# Patient Record
Sex: MALE | Race: BLACK OR AFRICAN AMERICAN | NOT HISPANIC OR LATINO | Employment: UNEMPLOYED | URBAN - METROPOLITAN AREA
[De-identification: names, ages, dates, MRNs, and addresses within clinical notes are randomized per-mention and may not be internally consistent; named-entity substitution may affect disease eponyms.]

---

## 2019-03-13 PROBLEM — M25.562 BILATERAL KNEE PAIN: Status: ACTIVE | Noted: 2019-03-13

## 2019-03-13 PROBLEM — M25.561 BILATERAL KNEE PAIN: Status: ACTIVE | Noted: 2019-03-13

## 2021-07-26 LAB — EGD FOLLOW UP EXTERNAL: NORMAL

## 2022-01-28 ENCOUNTER — TELEPHONE (OUTPATIENT)
Dept: NEUROLOGY | Facility: HOSPITAL | Age: 42
End: 2022-01-28
Payer: MEDICAID

## 2022-01-28 NOTE — TELEPHONE ENCOUNTER
Gi clinic appt scheduled with pt on Wednesday, February 16, 2022 at 930am from referral by Dr. Lane for diarrhea and abd pain.  Pt given clinic address and repeated all information given correctly.

## 2022-02-16 ENCOUNTER — OFFICE VISIT (OUTPATIENT)
Dept: NEUROLOGY | Facility: HOSPITAL | Age: 42
End: 2022-02-16
Attending: INTERNAL MEDICINE
Payer: MEDICAID

## 2022-02-16 VITALS
HEART RATE: 93 BPM | TEMPERATURE: 99 F | DIASTOLIC BLOOD PRESSURE: 75 MMHG | WEIGHT: 301.13 LBS | BODY MASS INDEX: 43.11 KG/M2 | SYSTOLIC BLOOD PRESSURE: 128 MMHG | RESPIRATION RATE: 18 BRPM | HEIGHT: 70 IN

## 2022-02-16 DIAGNOSIS — R10.9 ABDOMINAL PAIN, UNSPECIFIED ABDOMINAL LOCATION: Primary | ICD-10-CM

## 2022-02-16 PROCEDURE — 99215 OFFICE O/P EST HI 40 MIN: CPT | Performed by: INTERNAL MEDICINE

## 2022-02-16 RX ORDER — PREDNISONE 20 MG/1
20 TABLET ORAL 2 TIMES DAILY
COMMUNITY
Start: 2021-12-22 | End: 2023-11-22

## 2022-02-16 RX ORDER — ATORVASTATIN CALCIUM 40 MG/1
40 TABLET, FILM COATED ORAL DAILY
COMMUNITY
Start: 2021-11-08

## 2022-02-16 RX ORDER — CHOLESTYRAMINE 4 G/9G
POWDER, FOR SUSPENSION ORAL
COMMUNITY
Start: 2021-07-15 | End: 2023-11-22

## 2022-02-16 RX ORDER — LEVOFLOXACIN 500 MG/1
500 TABLET, FILM COATED ORAL DAILY
COMMUNITY
Start: 2021-12-22 | End: 2023-11-22

## 2022-02-16 RX ORDER — TAMSULOSIN HYDROCHLORIDE 0.4 MG/1
0.4 CAPSULE ORAL
COMMUNITY

## 2022-02-16 RX ORDER — GABAPENTIN 400 MG/1
400 CAPSULE ORAL
COMMUNITY
Start: 2021-12-29 | End: 2023-11-22

## 2022-02-16 RX ORDER — MELOXICAM 15 MG/1
15 TABLET ORAL
COMMUNITY
Start: 2021-12-29 | End: 2022-03-29

## 2022-02-16 RX ORDER — AMOXICILLIN 500 MG/1
TABLET, FILM COATED ORAL
COMMUNITY
End: 2023-11-22 | Stop reason: ALTCHOICE

## 2022-02-16 NOTE — PATIENT INSTRUCTIONS
Take Imodium daily for 4 weeks.  If symptoms don't resolve, take 1 IBgard with meals for 4 weeks.  If symptoms don't resolve, take Rifaxan 550mg three times daily for 2 weeks.    Audio follow up appointment scheduled on Wednesday, April 13, 2022 at 115pm.(This is a telephone visit, you will be called at this time.)

## 2022-02-16 NOTE — PROGRESS NOTES
"[facility]    CC: Post-prandial abdominal pain     HPI 41 y.o. male with history of HTN and chronic low back pain presenting for evaluation of chronic, post-prandial abdominal pain and fecal urgency. Patient admits to having post-prandial abdominal pain and cramping since childhood. He admits to having generalized pain and fatigue after eating certain foods and liquids especially lactose-containing products. He admits to having symptoms while eating meals, and he subsequently has fecal urgency without incontinence. He admits to mild improvement in his abdominal discomfort after having a bowel movement. His bowel movements are semi-solid, and he usually has one bowel movement daily. He denies any episodes of melena or hematochezia. He has tried multiple medications in the past for his symptoms with minimal improvement including dicyclomine, questran, colesevelam, and amitriptyline. He has also tried a low FODMAP diet with minimal improvement as well. He had a colonoscopy in 11/2020 which was normal including negative biopsies for microscopic colitis. He had a normal EGD in 7/2021. He has previously been evaluated for thyroid disease, Celiac disease and H. Pylori which were all negative.      I have reviewed his medical records from [facility] including prior medication history.     Past Medical History  IBS-D  HTN  OA    Physical Examination  /75 (BP Location: Left arm, Patient Position: Sitting, BP Method: Large (Automatic))   Pulse 93   Temp 98.6 °F (37 °C) (Oral)   Resp 18   Ht 5' 10" (1.778 m)   Wt (!) 136.6 kg (301 lb 2.4 oz)   BMI 43.21 kg/m²   General appearance: alert, cooperative, no distress  Lungs: clear to auscultation bilaterally, no dullness to percussion bilaterally  Heart: regular rate and rhythm without rub; no displacement of the PMI   Abdomen: soft, non-tender; bowel sounds normoactive; no organomegaly      Assessment:   41 y.o. male presenting with: "   1. Chronic, progressively worsening post-prandial abdominal pain without associated nausea, vomiting or weight loss. This symptom is most consistent with intestinal dysfunction such as impaired fundic accomodation   2. Fecal urgency after eating. (not really diarrhea). This is also likely to be functional in nature as an exaggeration of the gastrocolic reflex     Plan:  - Start taking imodium once daily for fecal urgency   - Start taking two tablets of IBgard prior to meals for post prandial abdominal pain   - If no improvement with above medications, will prescribe rifaximin 550 mg TID for 2 weeks for IBS-D  - If no improvement with Rifaximin, would consider trial of Creon x 4 weeks then alosetron (low dose)     RTC: 8 weeks via Telemedicine visit to re-assess symptoms after medication changes     Guero Palacio MD   [address] [place] [address], [address]   [place] [address] [phone]

## 2022-04-13 ENCOUNTER — OFFICE VISIT (OUTPATIENT)
Dept: NEUROLOGY | Facility: HOSPITAL | Age: 42
End: 2022-04-13
Attending: INTERNAL MEDICINE
Payer: MEDICAID

## 2022-04-13 DIAGNOSIS — R14.0 ABDOMINAL BLOATING: Primary | ICD-10-CM

## 2022-04-13 NOTE — PROGRESS NOTES
Established Patient - Audio Only Telehealth Visit     The patient location is: home  The chief complaint leading to consultation is: fecal urgency  Visit type: Virtual visit with audio only (telephone)  Total time spent with patient: 15 minutes  Total time spent on patient encounter: 30 minutes     The reason for the audio only service rather than synchronous audio and video virtual visit was related to technical difficulties or patient preference/necessity.     Each patient to whom I provide medical services by telemedicine is:  (1) informed of the relationship between the physician and patient and the respective role of any other health care provider with respect to management of the patient; and (2) notified that they may decline to receive medical services by telemedicine and may withdraw from such care at any time. Patient verbally consented to receive this service via voice-only telephone call.    CC: fecal urgency     HPI:   42yo M with HTN, low back pain presents via telemedicine to follow up post-prandial fecal urgency associated with bloating relieved with BMs. No diarrhea, incontinence or nocturnal awakening. No improvement in the past with bentyl, questran, colesevelam, elavil, or recent trial with imodium prior to meals or scheduled IBgard that was recommended after visit in Feb. Avoids lactose-containing foods. No coffee/tea/energy drinks. No artifical sweeteners. No coconut water. No abdominal XRT. No rectal bleeding or weight loss. No anorectal pain. Only occurs after eating and has to immediately use toilet. Yesterday ate fruit loops with almond milk, chicken, potato chips, and vegan/meatless meat product (ie beyond burger). Does not eat diet high in FODMAPs.    Prior workup includes normal colonoscopy with negative microscopic colitis bx (11/2020), normal EGD. Thyroid, celiac and h pylori testing negative.     Assessment:  42yo M with post-prandial bloating and fecal urgency without incontinence or  diarrhea for which testing has been unrevealing and has not improved despite medications trials with bentyl, questran, colesevelam, elavil, imodium or IBgard. Suspect IBS-D vs SIBO vs impaired gastric compliance vs exaggerated gastrocolic reflex vs non-celiac gluten senstivity. Fecal urgency is a chronic problem with exacerbation.     Plan:   - fiber supplementation with psylium husk BID scheduled for 2 weeks to bulk stools and slow gastric emptying  - if above fails, trial Xifaxan 550mg TID for 2 weeks for IBS-D & SIBO, Rx provided  - If no improvement with fiber or Rifaximin, would consider trial of Creon x 4 weeks then alosetron (low dose)      RTC via telemedicine in 5 weeks to re-assess symptoms     This service was not originating from a related E/M service provided within the previous 7 days nor will  to an E/M service or procedure within the next 24 hours or my soonest available appointment.  Prevailing standard of care was able to be met in this audio-only visit.

## 2022-05-18 ENCOUNTER — OFFICE VISIT (OUTPATIENT)
Dept: NEUROLOGY | Facility: HOSPITAL | Age: 42
End: 2022-05-18
Attending: INTERNAL MEDICINE
Payer: MEDICAID

## 2022-05-18 DIAGNOSIS — R10.9 ABDOMINAL PAIN, UNSPECIFIED ABDOMINAL LOCATION: Primary | ICD-10-CM

## 2022-05-18 DIAGNOSIS — R14.0 ABDOMINAL BLOATING: ICD-10-CM

## 2022-05-18 RX ORDER — ALOSETRON HYDROCHLORIDE 0.5 MG/1
0.5 TABLET ORAL 2 TIMES DAILY
Qty: 60 TABLET | Refills: 1 | Status: SHIPPED | OUTPATIENT
Start: 2022-05-18 | End: 2022-06-27

## 2022-05-18 NOTE — PROGRESS NOTES
[facility]: - Audio Only Telehealth Visit     The patient location is: Home  The chief complaint leading to consultation is: Abdominal pain  Visit type: Virtual visit with audio only (telephone)  Total time spent with patient: 15 minutes     The reason for the audio only service rather than synchronous audio and video virtual visit was related to technical difficulties or patient preference/necessity.     Each patient to whom I provide medical services by telemedicine is:  (1) informed of the relationship between the physician and patient and the respective role of any other health care provider with respect to management of the patient; and (2) notified that they may decline to receive medical services by telemedicine and may withdraw from such care at any time. Patient verbally consented to receive this service via voice-only telephone call.      HPI: 41 y.o. male with history of HTN and chronic low back pain presenting for follow up of chronic, post-prandial abdominal pain and fecal urgency. He admits to having generalized pain and fatigue after eating certain foods and liquids especially lactose-containing products. He admits to having symptoms while eating meals, and he subsequently has fecal urgency without incontinence. He admits to mild improvement in his abdominal discomfort after having a bowel movement. His bowel movements are semi-solid, and he usually has one bowel movement daily. He denies any episodes of melena or hematochezia. He has tried multiple medications in the past for his symptoms with minimal improvement including dicyclomine, questran, colesevelam, and amitriptyline. He has also tried a low FODMAP diet with minimal improvement as well. He had a colonoscopy in 11/2020 which was normal including negative biopsies for microscopic colitis. He had a normal EGD in 7/2021. He has previously been evaluated for thyroid disease, Celiac disease and H. Pylori which were all negative.       Interval History:  Since his last visit, he started on Imodium daily for fecal urgency and took trial of Xifaxan 550 mg BID x1 week but discontinued use because he didn't feel it helped. He is having 1 bowel movement daily without blood or melena. Mainly fecal urgency that bothers him. Has not identified any food triggers. Has not tried IBgard as previously recommended.     Assessment:    1. Chronic, progressively worsening post-prandial abdominal pain without associated nausea, vomiting or weight loss. This symptom is most consistent with intestinal dysfunction such as impaired fundic accomodation   2. Fecal urgency after eating. (not really diarrhea). This is also likely to be functional in nature as an exaggeration of the gastrocolic reflex    Plan:  - Recommend patient to start daily food diary to keep track of symptoms and potential food triggers.  - Recommend to start low dose alosetron 0.5 BID x 2-4 weeks as a trial for IBS-D.   - Advised he can take alosetron along with Imodium daily for fecal urgency.  - If no improvement, start IBgard or buspirone to help increase gastric accomodation. Alternatively, can try Creon x4 weeks.    RTC in 4 weeks via telemedicine to discuss symptoms      This service was not originating from a related E/M service provided within the previous 7 days nor will  to an E/M service or procedure within the next 24 hours or my soonest available appointment.  Prevailing standard of care was able to be met in this audio-only visit.

## 2022-05-19 ENCOUNTER — PATIENT MESSAGE (OUTPATIENT)
Dept: NEUROLOGY | Facility: HOSPITAL | Age: 42
End: 2022-05-19
Payer: MEDICAID

## 2022-05-20 ENCOUNTER — TELEPHONE (OUTPATIENT)
Dept: NEUROLOGY | Facility: HOSPITAL | Age: 42
End: 2022-05-20
Payer: MEDICAID

## 2022-06-15 ENCOUNTER — OFFICE VISIT (OUTPATIENT)
Dept: NEUROLOGY | Facility: HOSPITAL | Age: 42
End: 2022-06-15
Attending: INTERNAL MEDICINE
Payer: MEDICAID

## 2022-06-15 DIAGNOSIS — R10.9 ABDOMINAL PAIN, UNSPECIFIED ABDOMINAL LOCATION: Primary | ICD-10-CM

## 2022-06-15 NOTE — PROGRESS NOTES
Established Patient - Audio Only Telehealth Visit     The patient location is: home  The chief complaint leading to consultation is: abdominal pain  Visit type: Virtual visit with audio only (telephone)  Total time spent with patient: 15 mins  Total encounter time: 20 minutes       The reason for the audio only service rather than synchronous audio and video virtual visit was related to technical difficulties or patient preference/necessity.     Each patient to whom I provide medical services by telemedicine is:  (1) informed of the relationship between the physician and patient and the respective role of any other health care provider with respect to management of the patient; and (2) notified that they may decline to receive medical services by telemedicine and may withdraw from such care at any time. Patient verbally consented to receive this service via voice-only telephone call.       HPI: 41 y.o. male with history of HTN and chronic low back pain presenting for follow up of chronic, post-prandial abdominal pain and fecal urgency. He admits to having generalized pain and fatigue after eating certain foods and liquids especially lactose-containing products. He admits to having symptoms while eating meals, and he subsequently has fecal urgency without incontinence. He admits to mild improvement in his abdominal discomfort after having a bowel movement. His bowel movements are semi-solid, and he usually has one bowel movement daily. He denies any episodes of melena or hematochezia. He has tried multiple medications in the past for his symptoms with minimal improvement including dicyclomine, questran, colesevelam, and amitriptyline. He has also tried a low FODMAP diet with minimal improvement as well. He had a colonoscopy in 11/2020 which was normal including negative biopsies for microscopic colitis. He had a normal EGD in 7/2021. He has previously been evaluated for thyroid disease, Celiac disease and H. Pylori  which were all negative.       Interval History:  Since his last visit, he has been taking alosetran 0.5 bid as recommended in addition immodium. He reports this has improved his urgency to defecate after eating greatly. He has missed a dose a dose or two, and noticed his previous symptoms will occur. He also states he has an upcoming appt with bariatric surgery for surgical eval.      Assessment:  1. Chronic, progressively worsening post-prandial abdominal pain without associated nausea, vomiting or weight loss. This symptom is most consistent with intestinal dysfunction such as impaired fundic accomodation   2. Fecal urgency after eating. (not really diarrhea). This is also likely to be functional in nature as an exaggeration of the gastrocolic reflex     Plan:  -continue alostreon 0.5 bid with imodium, as it has improved his symptoms  -if symptoms resurface, advised to call back for evaluation; will consider [facility]    RTC PRN      This service was not originating from a related E/M service provided within the previous 7 days nor will  to an E/M service or procedure within the next 24 hours or my soonest available appointment.  Prevailing standard of care was able to be met in this audio-only visit.

## 2023-03-01 LAB
CHOLEST SERPL-MSCNC: 119 MG/DL (ref 0–200)
HDLC SERPL-MCNC: 31 MG/DL (ref 35–70)
LDLC SERPL CALC-MCNC: 65 MG/DL
TRIGL SERPL-MCNC: 115 MG/DL (ref 40–160)

## 2023-11-16 PROCEDURE — 80053 COMPREHEN METABOLIC PANEL: CPT | Performed by: FAMILY MEDICINE

## 2023-11-16 PROCEDURE — 84443 ASSAY THYROID STIM HORMONE: CPT | Performed by: FAMILY MEDICINE

## 2023-11-16 PROCEDURE — 80061 LIPID PANEL: CPT | Performed by: FAMILY MEDICINE

## 2023-11-16 PROCEDURE — 82306 VITAMIN D 25 HYDROXY: CPT | Performed by: FAMILY MEDICINE

## 2023-11-16 PROCEDURE — 82550 ASSAY OF CK (CPK): CPT | Performed by: FAMILY MEDICINE

## 2023-11-16 PROCEDURE — 84550 ASSAY OF BLOOD/URIC ACID: CPT | Performed by: FAMILY MEDICINE

## 2023-11-16 PROCEDURE — 85025 COMPLETE CBC W/AUTO DIFF WBC: CPT | Performed by: FAMILY MEDICINE

## 2023-11-20 PROBLEM — E66.9 OBESITY, UNSPECIFIED: Status: ACTIVE | Noted: 2023-11-20

## 2023-11-20 PROBLEM — E78.2 MIXED HYPERLIPIDEMIA: Status: ACTIVE | Noted: 2023-11-20

## 2023-11-20 PROBLEM — E79.0 HYPERURICEMIA: Status: ACTIVE | Noted: 2023-11-20

## 2023-11-20 PROBLEM — G47.33 OSA (OBSTRUCTIVE SLEEP APNEA): Status: ACTIVE | Noted: 2023-11-20

## 2023-11-20 PROBLEM — I10 HYPERTENSION: Status: ACTIVE | Noted: 2023-11-20

## 2023-11-20 PROBLEM — K21.9 GERD (GASTROESOPHAGEAL REFLUX DISEASE): Status: ACTIVE | Noted: 2023-11-20

## 2023-11-20 PROBLEM — G47.30 SLEEP APNEA: Status: ACTIVE | Noted: 2023-11-20

## 2023-11-22 ENCOUNTER — OFFICE VISIT (OUTPATIENT)
Dept: FAMILY MEDICINE | Facility: CLINIC | Age: 43
End: 2023-11-22
Payer: MEDICAID

## 2023-11-22 VITALS
HEIGHT: 70 IN | OXYGEN SATURATION: 97 % | SYSTOLIC BLOOD PRESSURE: 140 MMHG | BODY MASS INDEX: 44.38 KG/M2 | RESPIRATION RATE: 16 BRPM | TEMPERATURE: 98 F | HEART RATE: 78 BPM | DIASTOLIC BLOOD PRESSURE: 90 MMHG | WEIGHT: 310 LBS

## 2023-11-22 DIAGNOSIS — E78.2 MIXED HYPERLIPIDEMIA: ICD-10-CM

## 2023-11-22 DIAGNOSIS — I10 PRIMARY HYPERTENSION: Primary | ICD-10-CM

## 2023-11-22 DIAGNOSIS — E66.01 CLASS 3 SEVERE OBESITY DUE TO EXCESS CALORIES WITH SERIOUS COMORBIDITY AND BODY MASS INDEX (BMI) OF 40.0 TO 44.9 IN ADULT: ICD-10-CM

## 2023-11-22 PROBLEM — E66.813 CLASS 3 SEVERE OBESITY DUE TO EXCESS CALORIES WITH SERIOUS COMORBIDITY AND BODY MASS INDEX (BMI) OF 40.0 TO 44.9 IN ADULT: Status: ACTIVE | Noted: 2023-11-20

## 2023-11-22 PROCEDURE — 1159F MED LIST DOCD IN RCRD: CPT | Mod: CPTII,,, | Performed by: FAMILY MEDICINE

## 2023-11-22 PROCEDURE — 1160F PR REVIEW ALL MEDS BY PRESCRIBER/CLIN PHARMACIST DOCUMENTED: ICD-10-PCS | Mod: CPTII,,, | Performed by: FAMILY MEDICINE

## 2023-11-22 PROCEDURE — 3080F DIAST BP >= 90 MM HG: CPT | Mod: CPTII,,, | Performed by: FAMILY MEDICINE

## 2023-11-22 PROCEDURE — 4010F ACE/ARB THERAPY RXD/TAKEN: CPT | Mod: CPTII,,, | Performed by: FAMILY MEDICINE

## 2023-11-22 PROCEDURE — 1159F PR MEDICATION LIST DOCUMENTED IN MEDICAL RECORD: ICD-10-PCS | Mod: CPTII,,, | Performed by: FAMILY MEDICINE

## 2023-11-22 PROCEDURE — 3080F PR MOST RECENT DIASTOLIC BLOOD PRESSURE >= 90 MM HG: ICD-10-PCS | Mod: CPTII,,, | Performed by: FAMILY MEDICINE

## 2023-11-22 PROCEDURE — 1160F RVW MEDS BY RX/DR IN RCRD: CPT | Mod: CPTII,,, | Performed by: FAMILY MEDICINE

## 2023-11-22 PROCEDURE — 3077F PR MOST RECENT SYSTOLIC BLOOD PRESSURE >= 140 MM HG: ICD-10-PCS | Mod: CPTII,,, | Performed by: FAMILY MEDICINE

## 2023-11-22 PROCEDURE — 3008F BODY MASS INDEX DOCD: CPT | Mod: CPTII,,, | Performed by: FAMILY MEDICINE

## 2023-11-22 PROCEDURE — 4010F PR ACE/ARB THEARPY RXD/TAKEN: ICD-10-PCS | Mod: CPTII,,, | Performed by: FAMILY MEDICINE

## 2023-11-22 PROCEDURE — 3008F PR BODY MASS INDEX (BMI) DOCUMENTED: ICD-10-PCS | Mod: CPTII,,, | Performed by: FAMILY MEDICINE

## 2023-11-22 PROCEDURE — 99214 PR OFFICE/OUTPT VISIT, EST, LEVL IV, 30-39 MIN: ICD-10-PCS | Mod: ,,, | Performed by: FAMILY MEDICINE

## 2023-11-22 PROCEDURE — 3077F SYST BP >= 140 MM HG: CPT | Mod: CPTII,,, | Performed by: FAMILY MEDICINE

## 2023-11-22 PROCEDURE — 99214 OFFICE O/P EST MOD 30 MIN: CPT | Mod: ,,, | Performed by: FAMILY MEDICINE

## 2023-11-22 RX ORDER — KETOCONAZOLE 20 MG/G
1 CREAM TOPICAL
COMMUNITY

## 2023-11-22 NOTE — PROGRESS NOTES
Patient Name: Edmund Roberts     Patient ID: [id number]     Chief Complaint: Results (Go over lab results.)      HPI:     Edmund Roberts is a 43 y.o. male here today for follow up for Hypertension, Hyperlipidemia, and lab work results. Reviewed and discussed lab work results.    Past Medical History:   Diagnosis Date    Arthritis     B12 deficiency     Cholesterol depletion     Elevated serum creatinine     Gastritis     GERD (gastroesophageal reflux disease)     Hypertension     Hyperuricemia     IBS (irritable bowel syndrome)     Mixed hyperlipidemia     Obesity, unspecified     ALICE (obstructive sleep apnea)     Plantar fasciitis, bilateral     Prostatitis, chronic     Sleep apnea     Vitamin D3 deficiency         Past Surgical History:   Procedure Laterality Date    BACK SURGERY  2006    BACK SURGERY  2013    CARPAL TUNNEL RELEASE  2019    CHOLECYSTECTOMY  06/2018    CYST REMOVAL Left 2005    SINUS SURGERY      TONSILLECTOMY  03/2017    TRIGGER FINGER RELEASE  11/2019        Social History     Socioeconomic History    Marital status: Single    Number of children: 5   Tobacco Use    Smoking status: Never    Smokeless tobacco: Never   Substance and Sexual Activity    Alcohol use: No    Drug use: No    Sexual activity: Yes     Partners: Female     Social Determinants of Health     Financial Resource Strain: Low Risk  (11/22/2023)    Overall Financial Resource Strain (CARDIA)     Difficulty of Paying Living Expenses: Not hard at all   Recent Concern: Financial Resource Strain - High Risk (11/16/2023)    Overall Financial Resource Strain (CARDIA)     Difficulty of Paying Living Expenses: Very hard   Food Insecurity: No Food Insecurity (11/22/2023)    Hunger Vital Sign     Worried About Running Out of Food in the Last Year: Never true     Ran Out of Food in the Last Year: Never true   Recent Concern: Food Insecurity - Food Insecurity Present (11/16/2023)    Hunger Vital Sign     Ran Out of Food in the Last Year:  Sometimes true   Transportation Needs: No Transportation Needs (11/22/2023)    PRAPARE - Transportation     Lack of Transportation (Medical): No     Lack of Transportation (Non-Medical): No   Physical Activity: Inactive (11/22/2023)    Exercise Vital Sign     Days of Exercise per Week: 0 days     Minutes of Exercise per Session: 0 min   Stress: Stress Concern Present (11/22/2023)    [employer] - Occupational Stress Questionnaire     Feeling of Stress : To some extent   Social Connections: Socially Isolated (11/22/2023)    Social Connection and Isolation Panel [NHANES]     Frequency of Communication with Friends and Family: Once a week     Frequency of Social Gatherings with Friends and Family: Once a week     Attends [employer] Services: 1 to 4 times per year     Active Member of Clubs or Organizations: No     Attends Club or Organization Meetings: Never     Marital Status: Never    Housing Stability: Unknown (11/22/2023)    Housing Stability Vital Sign     Unable to Pay for Housing in the Last Year: No     Unstable Housing in the Last Year: No        Current Outpatient Medications   Medication Instructions    atorvastatin (LIPITOR) 40 mg, Oral, Daily    fluticasone propionate (FLONASE) 50 mcg/actuation nasal spray fluticasone propionate 50 mcg/actuation nasal spray,suspension    hydroCHLOROthiazide (HYDRODIURIL) 25 mg, Oral, Daily    irbesartan (AVAPRO) 150 mg, Oral, Daily    ketoconazole (NIZORAL) 2 % cream 1 Application, Topical (Top), As needed (PRN)    tamsulosin (FLOMAX) 0.4 mg, Oral       Review of patient's allergies indicates:  No Known Allergies     Immunization History   Administered Date(s) Administered    COVID-19, MRNA, LN-S, PF (MODERNA FULL 0.5 ML DOSE) 08/12/2021, 09/09/2021    DTP 02/19/1980, 03/18/1980, 10/16/1980, 04/26/1982, 03/29/1984    Hepatitis B, Adult 12/01/2009, 06/29/2010    Influenza - Quadrivalent - PF *Preferred* (6 months and older) 03/14/2017     "MMR 04/26/1982    OPV 02/19/1980, 03/18/1980, 10/16/1980, 04/26/1982, 03/29/1984    Td (ADULT) 04/07/1994       Patient Care Team:  Omega Talavera Sr., MD as PCP - General (Family Medicine)  Demond Mcrae MD as Consulting Physician (Otolaryngology)  Fran Jordan MD as Consulting Physician (Pulmonary Disease)  [facility]  Romina Bowen MD as Consulting Physician (Cardiology)     Subjective:     Review of Systems    10 point review of systems conducted, negative except as stated in the history of present illness. See HPI for details.    Objective:     Visit Vitals  BP (!) 140/90 (BP Location: Right arm, Patient Position: Sitting, BP Method: Large (Manual))   Pulse 78   Temp 98.3 °F (36.8 °C)   Resp 16   Ht 5' 10" (1.778 m)   Wt (!) 140.6 kg (310 lb)   SpO2 97%   BMI 44.48 kg/m²       Physical Exam  Constitutional:       Appearance: He is obese.   HENT:      Head: Normocephalic and atraumatic.   Cardiovascular:      Rate and Rhythm: Normal rate and regular rhythm.   Pulmonary:      Effort: Pulmonary effort is normal.      Breath sounds: Normal breath sounds.   Abdominal:      Palpations: Abdomen is soft.      Tenderness: There is no abdominal tenderness.   Musculoskeletal:         General: No swelling or tenderness. Normal range of motion.      Cervical back: Normal range of motion and neck supple.      Right lower leg: No edema.      Left lower leg: No edema.   Lymphadenopathy:      Cervical: No cervical adenopathy.   Skin:     General: Skin is warm and dry.   Neurological:      General: No focal deficit present.      Mental Status: He is alert and oriented to person, place, and time.   Psychiatric:         Mood and Affect: Mood normal.         Assessment:       ICD-10-CM ICD-9-CM   1. Primary hypertension  I10 401.9   2. Mixed hyperlipidemia  E78.2 272.2   3. Class 3 severe obesity due to excess calories with serious comorbidity and " body mass index (BMI) of 40.0 to 44.9 in adult  E66.01 278.01    Z68.41 V85.41       Plan:   1. Primary hypertension  Overview:  Continue with Irbesartan 150 mg daily + HCTZ 25 mg daily as prescribed by Cardiology.   Followed by Cardiology.  Low Sodium Diet (DASH Diet - Less than 2 grams of sodium per day).  Monitor blood pressure daily and log. Report consistent numbers greater than 140/90.  Maintain healthy weight with goal BMI <30. Exercise 30 minutes per day, 5 days per week.    Assessment & Plan:  BP Readings from Last 1 Encounters:   11/22/23 (!) 140/90   Patient is not at goal.  Instructed patient to follow up with his Cardiologist regarding his high blood pressure at his appointment in 1 week.      2. Mixed hyperlipidemia  Overview:  Continue with Lipitor 40 mg nightly as prescribed by Cardiology.  Followed by Cardiology.  Stressed importance of dietary modifications. Follow a low cholesterol, low saturated fat diet with less that 200mg of cholesterol a day.  Avoid fried foods and high saturated fats (high saturated fats less than 7% of calories).  Add Flax Seed/Fish Oil supplements to diet. Increase dietary fiber.  Regular exercise can reduce LDL and raise HDL. Stressed importance of physical activity 5 times per week for 30 minutes per day.       Assessment & Plan:  Lab Results   Component Value Date    LDL 79.00 11/16/2023    TRIG 105 11/16/2023    TRIG 115 03/01/2023    HDL 29 (L) 11/16/2023    HDL 31 (A) 03/01/2023    TOTALCHOLEST 4 11/16/2023   Patient is at goal.      3. Class 3 severe obesity due to excess calories with serious comorbidity and body mass index (BMI) of 40.0 to 44.9 in adult  Overview:  Body mass index is 44.48 kg/m².  Goal BMI <30.  Exercise 5 times a week for 30 minutes per day.  Avoid soda, simple sugars, excessive rice, potatoes or bread. Limit fast foods and fried foods.  Choose complex carbs in moderation (example: green vegetables, beans, oatmeal). Eat plenty of fresh fruits and  vegetables with lean meats daily.  Do not skip meals. Eat a balanced portion size.  Avoid fad diets. Consider permanent healthy life style changes.           [x] Discussed lab findings with the patient.  [x] Discussed diet, exercise and if appropriate, weight loss.  [] Instructions and information, including risks and benefits of prescribed medication(s) have been reviewed with the patient and patient verbalizes understanding. Questions have been answered to the patient's satisfaction.  [] Appropriate counseling has been given regarding anxiety and depressive issues that were discussed today.  [] Any lab drawn today will be reviewed by physician at the time it is received and appropriate recommendations bill be made and discussed with patient.     Follow up in about 6 months (around 5/22/2024) for Follow Up.   In addition to their scheduled follow up, the patient has also been instructed to follow up on as needed basis.     Future Appointments   Date Time Provider Department Center   5/22/2024  7[facility]   5/29/2024  1:30 PM Omega Talavera Sr., MD [facility] Talavera Sr, MD

## 2023-11-22 NOTE — ASSESSMENT & PLAN NOTE
Lab Results   Component Value Date    LDL 79.00 11/16/2023    TRIG 105 11/16/2023    TRIG 115 03/01/2023    HDL 29 (L) 11/16/2023    HDL 31 (A) 03/01/2023    TOTALCHOLEST 4 11/16/2023   Patient is at goal.

## 2023-11-22 NOTE — ASSESSMENT & PLAN NOTE
BP Readings from Last 1 Encounters:   11/22/23 (!) 140/90   Patient is not at goal.  Instructed patient to follow up with his Cardiologist regarding his high blood pressure at his appointment in 1 week.

## 2024-03-27 ENCOUNTER — TELEPHONE (OUTPATIENT)
Dept: FAMILY MEDICINE | Facility: CLINIC | Age: 44
End: 2024-03-27
Payer: MEDICAID

## 2024-03-27 NOTE — TELEPHONE ENCOUNTER
Pt was called and instructed to contact his insurance company for other surgeons in his network.   He was advised to discuss his timeline expectations with his current surgeon.      Pt. reports he is afebrile . He does have a scratchy throat . He was encouraged to try OTC tylenol and a gargle . If symptoms persist he was instructed to call for an apt.

## 2024-03-27 NOTE — TELEPHONE ENCOUNTER
----- Message from Eliz Jeronimo sent at 3/25/2024  1:19 PM CDT -----  HE SEES AN ORTHOPEDIC SURGEON IN [place], DR. LON MYERS, BUT HE SAYS THE DOC IS DRAGGING THINGS OUT SO HE WANTS A REFERRAL FOR ANOTHER ORTHOPEDIC SURGEON.    HE HAS A SCRATCHY THROAT, RUNNY NOSE AND COUGHING UP MUCOUS.  HE WENT TO URGENT CARE ON FRIDAY BUT ALL THEY DID WAS TELL HIM TO TAKE IBUPROFEN 800 BUT IT ISN'T HELPING, CAN HE HAVE SOMETHING CALLED IN? WG PV

## 2024-07-09 PROCEDURE — 85025 COMPLETE CBC W/AUTO DIFF WBC: CPT | Performed by: FAMILY MEDICINE

## 2024-07-09 PROCEDURE — 80053 COMPREHEN METABOLIC PANEL: CPT | Performed by: FAMILY MEDICINE

## 2024-07-09 PROCEDURE — 82550 ASSAY OF CK (CPK): CPT | Performed by: FAMILY MEDICINE

## 2024-07-09 PROCEDURE — 82306 VITAMIN D 25 HYDROXY: CPT | Performed by: FAMILY MEDICINE

## 2024-07-09 PROCEDURE — 80061 LIPID PANEL: CPT | Performed by: FAMILY MEDICINE

## 2024-07-09 PROCEDURE — 84550 ASSAY OF BLOOD/URIC ACID: CPT | Performed by: FAMILY MEDICINE

## 2024-07-09 PROCEDURE — 84443 ASSAY THYROID STIM HORMONE: CPT | Performed by: FAMILY MEDICINE

## 2024-07-15 PROBLEM — R79.89 LOW VITAMIN D LEVEL: Status: ACTIVE | Noted: 2024-07-15

## 2024-07-15 NOTE — ASSESSMENT & PLAN NOTE
Lab Results   Component Value Date    LDL 79.00 11/16/2023    TRIG 105 11/16/2023    TRIG 115 03/01/2023    HDL 29 (L) 11/16/2023    HDL 31 (A) 03/01/2023    TOTALCHOLEST 4 11/16/2023     At goal   Continue above medication at same dose   Continue following with cardiology   Repeat lipids 6 months      Stressed importance of dietary modifications. Follow a low cholesterol, low saturated fat diet with less that 200mg of cholesterol a day.  Avoid fried foods and high saturated fats (high saturated fats less than 7% of calories).  Add Flax Seed/Fish Oil supplements to diet. Increase dietary fiber.  Regular exercise can reduce LDL and raise HDL. Stressed importance of physical activity 5 times per week for 30 minutes per day.

## 2024-07-15 NOTE — ASSESSMENT & PLAN NOTE
BP Readings from Last 1 Encounters:   11/22/23 (!) 140/90     Not at goal  Continue above medication at same dose   He will call Cardiology and prefers to let them handle it    Low Sodium Diet (DASH Diet - Less than 2 grams of sodium per day).  Monitor blood pressure daily and log. Report consistent numbers greater than 140/90.  Maintain healthy weight with goal BMI <30. Exercise 30 minutes per day, 5 days per week.

## 2024-07-15 NOTE — PROGRESS NOTES
Family Medicine    Patient ID: [id number]     Chief Complaint: Follow-up (Lab results)      HPI:     Edmund Roberts is a 43 y.o. male here today for a follow up.     Past Medical History:   Diagnosis Date    Arthritis     B12 deficiency     Cholesterol depletion     Elevated serum creatinine     Gastritis     GERD (gastroesophageal reflux disease)     Hypertension     Hyperuricemia     IBS (irritable bowel syndrome)     Mixed hyperlipidemia     Obesity, unspecified     ALICE (obstructive sleep apnea)     Plantar fasciitis, bilateral     Prostatitis, chronic     Sleep apnea     Vitamin D3 deficiency         Past Surgical History:   Procedure Laterality Date    BACK SURGERY  2006    BACK SURGERY  2013    CARPAL TUNNEL RELEASE  2019    CHOLECYSTECTOMY  06/2018    CYST REMOVAL Left 2005    SINUS SURGERY      TONSILLECTOMY  03/2017    TRIGGER FINGER RELEASE  11/2019        Social History     Tobacco Use    Smoking status: Never    Smokeless tobacco: Never   Substance and Sexual Activity    Alcohol use: No    Drug use: No    Sexual activity: Yes     Partners: Female        Current Outpatient Medications   Medication Instructions    atorvastatin (LIPITOR) 40 mg, Oral, Daily    celecoxib (CELEBREX) 100 mg, Oral, Daily    fluticasone propionate (FLONASE) 50 mcg/actuation nasal spray fluticasone propionate 50 mcg/actuation nasal spray,suspension    hydroCHLOROthiazide (HYDRODIURIL) 25 mg, Oral, Daily    hyoscyamine 0.125 mg, Sublingual, Every 6 hours PRN    irbesartan (AVAPRO) 300 mg, Oral, Daily    ketoconazole (NIZORAL) 2 % cream 1 Application, Topical (Top), As needed (PRN)    pantoprazole (PROTONIX) 40 mg, Oral, Daily    sucralfate (CARAFATE) 1 g, Oral, 2 times daily    tamsulosin (FLOMAX) 0.4 mg       Review of patient's allergies indicates:  No Known Allergies     Patient Care Team:  Omega Talavera Sr., MD as PCP - General (Family Medicine)  Demond Mcrae MD as Consulting Physician  "(Otolaryngology)  Fran Jordan MD as Consulting Physician (Pulmonary Disease)  Surgery, [facility] - Trauma/General (Trauma Surgery)  Romina Bowen MD as Consulting Physician (Cardiology)     Subjective:     Review of Systems    12 point review of systems conducted, negative except as stated in the history of present illness. See HPI for details.    Objective:     Visit Vitals  BP (!) 140/87 (BP Location: Left arm, Patient Position: Sitting)   Pulse 80   Temp 97.6 °F (36.4 °C)   Ht 5' 10" (1.778 m)   Wt (!) 141.5 kg (312 lb)   BMI 44.77 kg/m²       Physical Exam    Labs Reviewed:     Chemistry:  Lab Results   Component Value Date     07/09/2024    K 4.2 07/09/2024    CHLORIDE 102 10/03/2022    BUN 10.1 07/09/2024    CREATININE 1.25 (H) 07/09/2024    EGFRNORACEVR >60 07/09/2024    GLUCOSE 88 07/09/2024    CALCIUM 9.8 07/09/2024    ALKPHOS 140 07/09/2024    LABPROT 7.0 07/09/2024    ALBUMIN 4.1 07/09/2024    AST 17 07/09/2024    ALT 27 07/09/2024    [id number] 16 (L) 07/09/2024    TSH 3.125 07/09/2024        Lab Results   Component Value Date    HGBA1C 6.1 (H) 10/03/2022        Hematology:  Lab Results   Component Value Date    WBC 9.66 07/09/2024    HGB 13.9 (L) 07/09/2024    HCT 43.0 07/09/2024     07/09/2024       Lipid Panel:  Lab Results   Component Value Date    CHOL 131 07/09/2024    HDL 33 (L) 07/09/2024    LDL 76.00 07/09/2024    TRIG 108 07/09/2024    TOTALCHOLEST 4 07/09/2024        Urine:  No results found for: "COLORUA", "APPEARANCEUA", "SGUA", "PHUA", "PROTEINUA", "GLUCOSEUA", "KETONESUA", "BLOODUA", "NITRITESUA", "LEUKOCYTESUR", "RBCUA", "WBCUA", "BACTERIA", "SQEPUA", "HYALINECASTS", "CREATRANDUR", "PROTEINURINE", "UPROTCREA"     Assessment:       ICD-10-CM ICD-9-CM   1. Low vitamin D level  R79.89 790.6   2. Mixed hyperlipidemia  E78.2 272.2   3. Primary hypertension  I10 401.9   4. Class 3 severe obesity due to excess calories with serious comorbidity and body mass " index (BMI) of 40.0 to 44.9 in adult  E66.01 278.01    Z68.41 V85.41   5. History of orthopedic surgery  Z98.890 V45.89        Plan:     1. Low vitamin D level  Overview:  Vitamin-D level today:  16  Goal vitamin-D 40-60 per endocrine society    Plan:  3000 IU OTC vitamin-D once daily  Repeat vitamin-D in three-months      2. Mixed hyperlipidemia  Overview:  Lipitor 40 mg nightly as prescribed by Cardiology.  Followed by Cardiology.    Assessment & Plan:  Lab Results   Component Value Date    LDL 79.00 11/16/2023    TRIG 105 11/16/2023    TRIG 115 03/01/2023    HDL 29 (L) 11/16/2023    HDL 31 (A) 03/01/2023    TOTALCHOLEST 4 11/16/2023     At goal   Continue above medication at same dose   Continue following with cardiology   Repeat lipids 6 months      Stressed importance of dietary modifications. Follow a low cholesterol, low saturated fat diet with less that 200mg of cholesterol a day.  Avoid fried foods and high saturated fats (high saturated fats less than 7% of calories).  Add Flax Seed/Fish Oil supplements to diet. Increase dietary fiber.  Regular exercise can reduce LDL and raise HDL. Stressed importance of physical activity 5 times per week for 30 minutes per day.         3. Primary hypertension  Overview:  Irbesartan 150 mg daily + HCTZ 25 mg daily as prescribed by Cardiology.   Followed by Cardiology.    Assessment & Plan:  BP Readings from Last 1 Encounters:   11/22/23 (!) 140/90     Not at goal  Continue above medication at same dose   He will call Cardiology and prefers to let them handle it    Low Sodium Diet (DASH Diet - Less than 2 grams of sodium per day).  Monitor blood pressure daily and log. Report consistent numbers greater than 140/90.  Maintain healthy weight with goal BMI <30. Exercise 30 minutes per day, 5 days per week.        4. Class 3 severe obesity due to excess calories with serious comorbidity and body mass index (BMI) of 40.0 to 44.9 in adult  Overview:  Body mass index is 44.77  kg/m².    Was being set up for bariatric surgery and [place] 2 years ago  Had the psych evaluation  Met with a dietitian and do with the asked  Never got a preop appointment  We would like to establish with bariatric and [facility]    Assessment & Plan:  Refer to obesity medicine and bariatric    Orders:  -     Ambulatory referral/consult to Bariatric/Obesity Medicine; Future; Expected date: 07/24/2024    5. History of orthopedic surgery  Overview:  2006:  Lumbar diskectomy  2013:  Lumbar fusion  2019:  Left elbow, ulnar release     Surgeries performed in [place]  Still having lumbar pain  Now having difficulty extending his left elbow fully  We would like referral to [facility] and [facility] for both lumbar and left elbow  Declines physical therapy      Assessment & Plan:  Orthopedic referrals to [facility] for left elbow and low back  We will hold off on x-rays since [facility] performed them on site    Orders:  -     Ambulatory referral/consult to Orthopedics; Future; Expected date: 07/24/2024  -     Ambulatory referral/consult to Orthopedics; Future; Expected date: 07/24/2024         Follow up in about 3 months (around 10/17/2024) for With Labs Prior to Visit, vitamin-D, A1c, CMP, CBC. In addition to their scheduled follow up, the patient has also been instructed to follow up on as needed basis.     Future Appointments   Date Time Provider Department Center   7/17/2024  3:20 PM PROVIDER, [facility]   10/14/2024  9:20 AM LAB, [facility]   10/21/2024 11:20 AM PROVIDER, [facility] ANISHA Padgett MD

## 2024-07-17 ENCOUNTER — OFFICE VISIT (OUTPATIENT)
Dept: FAMILY MEDICINE | Facility: CLINIC | Age: 44
End: 2024-07-17
Payer: MEDICAID

## 2024-07-17 VITALS
DIASTOLIC BLOOD PRESSURE: 87 MMHG | BODY MASS INDEX: 44.67 KG/M2 | WEIGHT: 312 LBS | SYSTOLIC BLOOD PRESSURE: 140 MMHG | TEMPERATURE: 98 F | HEIGHT: 70 IN | HEART RATE: 80 BPM

## 2024-07-17 DIAGNOSIS — I10 PRIMARY HYPERTENSION: ICD-10-CM

## 2024-07-17 DIAGNOSIS — E66.01 CLASS 3 SEVERE OBESITY DUE TO EXCESS CALORIES WITH SERIOUS COMORBIDITY AND BODY MASS INDEX (BMI) OF 40.0 TO 44.9 IN ADULT: ICD-10-CM

## 2024-07-17 DIAGNOSIS — R79.89 LOW VITAMIN D LEVEL: Primary | ICD-10-CM

## 2024-07-17 DIAGNOSIS — E78.2 MIXED HYPERLIPIDEMIA: ICD-10-CM

## 2024-07-17 DIAGNOSIS — Z98.890 HISTORY OF ORTHOPEDIC SURGERY: ICD-10-CM

## 2024-07-17 PROCEDURE — 3077F SYST BP >= 140 MM HG: CPT | Mod: CPTII,,, | Performed by: FAMILY MEDICINE

## 2024-07-17 PROCEDURE — 1159F MED LIST DOCD IN RCRD: CPT | Mod: CPTII,,, | Performed by: FAMILY MEDICINE

## 2024-07-17 PROCEDURE — 1160F RVW MEDS BY RX/DR IN RCRD: CPT | Mod: CPTII,,, | Performed by: FAMILY MEDICINE

## 2024-07-17 PROCEDURE — 3008F BODY MASS INDEX DOCD: CPT | Mod: CPTII,,, | Performed by: FAMILY MEDICINE

## 2024-07-17 PROCEDURE — 99214 OFFICE O/P EST MOD 30 MIN: CPT | Mod: ,,, | Performed by: FAMILY MEDICINE

## 2024-07-17 PROCEDURE — 4010F ACE/ARB THERAPY RXD/TAKEN: CPT | Mod: CPTII,,, | Performed by: FAMILY MEDICINE

## 2024-07-17 PROCEDURE — 3079F DIAST BP 80-89 MM HG: CPT | Mod: CPTII,,, | Performed by: FAMILY MEDICINE

## 2024-07-17 RX ORDER — PANTOPRAZOLE SODIUM 40 MG/1
40 TABLET, DELAYED RELEASE ORAL DAILY
COMMUNITY
Start: 2024-06-26

## 2024-07-17 RX ORDER — SUCRALFATE 1 G/1
1 TABLET ORAL 2 TIMES DAILY
COMMUNITY

## 2024-07-17 RX ORDER — HYOSCYAMINE SULFATE 0.12 MG/1
0.12 TABLET SUBLINGUAL EVERY 6 HOURS PRN
COMMUNITY
Start: 2024-05-22

## 2024-07-17 RX ORDER — CELECOXIB 100 MG/1
100 CAPSULE ORAL DAILY
COMMUNITY

## 2024-07-17 NOTE — ASSESSMENT & PLAN NOTE
Orthopedic referrals to [facility] for left elbow and low back  We will hold off on x-rays since [facility] performed them on site

## 2024-08-19 DIAGNOSIS — Z98.890 HISTORY OF ORTHOPEDIC SURGERY: Primary | ICD-10-CM

## 2024-08-19 DIAGNOSIS — M25.561 CHRONIC PAIN OF BOTH KNEES: ICD-10-CM

## 2024-08-19 DIAGNOSIS — G89.29 CHRONIC PAIN OF BOTH KNEES: ICD-10-CM

## 2024-08-19 DIAGNOSIS — M25.562 CHRONIC PAIN OF BOTH KNEES: ICD-10-CM

## 2024-08-19 DIAGNOSIS — M54.50 LUMBAR PAIN: ICD-10-CM

## 2024-08-19 DIAGNOSIS — M25.529 ELBOW PAIN, UNSPECIFIED LATERALITY: ICD-10-CM

## 2024-09-09 ENCOUNTER — OFFICE VISIT (OUTPATIENT)
Dept: FAMILY MEDICINE | Facility: CLINIC | Age: 44
End: 2024-09-09
Payer: MEDICAID

## 2024-09-09 VITALS
BODY MASS INDEX: 45.1 KG/M2 | OXYGEN SATURATION: 96 % | SYSTOLIC BLOOD PRESSURE: 129 MMHG | HEART RATE: 80 BPM | HEIGHT: 70 IN | DIASTOLIC BLOOD PRESSURE: 75 MMHG | WEIGHT: 315 LBS | RESPIRATION RATE: 18 BRPM | TEMPERATURE: 98 F

## 2024-09-09 DIAGNOSIS — Z98.890 HISTORY OF ORTHOPEDIC SURGERY: ICD-10-CM

## 2024-09-09 DIAGNOSIS — M25.531 RIGHT WRIST PAIN: Primary | ICD-10-CM

## 2024-09-09 PROCEDURE — 3078F DIAST BP <80 MM HG: CPT | Mod: CPTII,,, | Performed by: FAMILY MEDICINE

## 2024-09-09 PROCEDURE — 3074F SYST BP LT 130 MM HG: CPT | Mod: CPTII,,, | Performed by: FAMILY MEDICINE

## 2024-09-09 PROCEDURE — 1160F RVW MEDS BY RX/DR IN RCRD: CPT | Mod: CPTII,,, | Performed by: FAMILY MEDICINE

## 2024-09-09 PROCEDURE — 4010F ACE/ARB THERAPY RXD/TAKEN: CPT | Mod: CPTII,,, | Performed by: FAMILY MEDICINE

## 2024-09-09 PROCEDURE — 1159F MED LIST DOCD IN RCRD: CPT | Mod: CPTII,,, | Performed by: FAMILY MEDICINE

## 2024-09-09 PROCEDURE — 99213 OFFICE O/P EST LOW 20 MIN: CPT | Mod: ,,, | Performed by: FAMILY MEDICINE

## 2024-09-09 PROCEDURE — 3008F BODY MASS INDEX DOCD: CPT | Mod: CPTII,,, | Performed by: FAMILY MEDICINE

## 2024-09-09 RX ORDER — TRAMADOL HYDROCHLORIDE 50 MG/1
50 TABLET ORAL EVERY 8 HOURS PRN
COMMUNITY
Start: 2024-09-07 | End: 2024-09-09

## 2024-09-09 RX ORDER — TRAMADOL HYDROCHLORIDE 50 MG/1
50 TABLET ORAL EVERY 6 HOURS PRN
Qty: 20 TABLET | Refills: 0 | Status: SHIPPED | OUTPATIENT
Start: 2024-09-10

## 2024-09-09 NOTE — ASSESSMENT & PLAN NOTE
Refer to orthopedics again, this time for right wrist pain  Need to find someone to take his insurance, possibly [employer]

## 2024-09-09 NOTE — PROGRESS NOTES
Family Medicine    Patient ID: [id number]     Chief Complaint: Hospital Follow Up ([facility] [place]- arthritis of right wrist)      HPI:     Edmund Roberts is a 43 y.o. male here today for right wrist pain.  History of bilateral carpal tunnel releases.  Two weeks ago had right wrist pain went to ER put in a brace, given pain medication.  Phalen's positive, now having right carpal tunnel like symptoms again radiating to the right thumb with medial distribution paresthesias.  Would like referral to orthopedic    Past Medical History:   Diagnosis Date    Arthritis     B12 deficiency     Cholesterol depletion     Elevated serum creatinine     Gastritis     GERD (gastroesophageal reflux disease)     Hypertension     Hyperuricemia     IBS (irritable bowel syndrome)     Mixed hyperlipidemia     Obesity, unspecified     ALICE (obstructive sleep apnea)     Plantar fasciitis, bilateral     Prostatitis, chronic     Sleep apnea     Vitamin D3 deficiency         Past Surgical History:   Procedure Laterality Date    BACK SURGERY  2006    BACK SURGERY  2013    CARPAL TUNNEL RELEASE  2019    CHOLECYSTECTOMY  06/2018    CYST REMOVAL Left 2005    SINUS SURGERY      TONSILLECTOMY  03/2017    TRIGGER FINGER RELEASE  11/2019        Social History     Tobacco Use    Smoking status: Never    Smokeless tobacco: Never   Substance and Sexual Activity    Alcohol use: No    Drug use: Not Currently    Sexual activity: Yes     Partners: Female        Current Outpatient Medications   Medication Instructions    atorvastatin (LIPITOR) 40 mg, Oral, Daily    celecoxib (CELEBREX) 100 mg, Oral, Daily    fluticasone propionate (FLONASE) 50 mcg/actuation nasal spray fluticasone propionate 50 mcg/actuation nasal spray,suspension    hydroCHLOROthiazide (HYDRODIURIL) 25 mg, Oral, Daily    hyoscyamine 0.125 mg, Every 6 hours PRN    irbesartan (AVAPRO) 300 mg, Oral, Daily    ketoconazole (NIZORAL) 2 % cream 1 Application, Topical (Top), As needed (PRN)    pantoprazole  "(PROTONIX) 40 mg, Oral, Daily    sucralfate (CARAFATE) 1 g, Oral, 2 times daily    tamsulosin (FLOMAX) 0.4 mg    [START ON 9/10/2024] traMADoL (ULTRAM) 50 mg, Oral, Every 6 hours PRN       Review of patient's allergies indicates:  No Known Allergies     Patient Care Team:  Omega Talavera Sr., MD as PCP - General (Family Medicine)  Demond Mcrae MD as Consulting Physician (Otolaryngology)  Fran Jordan MD as Consulting Physician (Pulmonary Disease)  [facility]  Romina Bowen MD as Consulting Physician (Cardiology)     Subjective:     Review of Systems    12 point review of systems conducted, negative except as stated in the history of present illness. See HPI for details.    Objective:     Visit Vitals  /75 (BP Location: Left arm, Patient Position: Sitting)   Pulse 80   Temp 98.1 °F (36.7 °C)   Resp 18   Ht 5' 10" (1.778 m)   Wt (!) 143 kg (315 lb 3.2 oz)   SpO2 96%   BMI 45.23 kg/m²       Physical Exam    Labs Reviewed:     Chemistry:  Lab Results   Component Value Date     07/09/2024    K 4.2 07/09/2024    CHLORIDE 102 10/03/2022    BUN 10.1 07/09/2024    CREATININE 1.25 (H) 07/09/2024    EGFRNORACEVR >60 07/09/2024    GLUCOSE 88 07/09/2024    CALCIUM 9.8 07/09/2024    ALKPHOS 140 07/09/2024    LABPROT 7.0 07/09/2024    ALBUMIN 4.1 07/09/2024    AST 17 07/09/2024    ALT 27 07/09/2024    [id number] 16 (L) 07/09/2024    TSH 3.125 07/09/2024        Lab Results   Component Value Date    HGBA1C 6.1 (H) 10/03/2022        Hematology:  Lab Results   Component Value Date    WBC 9.66 07/09/2024    HGB 13.9 (L) 07/09/2024    HCT 43.0 07/09/2024     07/09/2024       Lipid Panel:  Lab Results   Component Value Date    CHOL 131 07/09/2024    HDL 33 (L) 07/09/2024    LDL 76.00 07/09/2024    TRIG 108 07/09/2024    TOTALCHOLEST 4 07/09/2024        Urine:  No results found for: "COLORUA", "APPEARANCEUA", "SGUA", "PHUA", " ""PROTEINUA", "GLUCOSEUA", "KETONESUA", "BLOODUA", "NITRITESUA", "LEUKOCYTESUR", "RBCUA", "WBCUA", "BACTERIA", "SQEPUA", "HYALINECASTS", "CREATRANDUR", "PROTEINURINE", "UPROTCREA"     Assessment:       ICD-10-CM ICD-9-CM   1. Right wrist pain  M25.531 719.43   2. History of orthopedic surgery  Z98.890 V45.89        Plan:     1. Right wrist pain  Overview:  See orthopedic surgery problem  History of bilateral carpal tunnel releases in 2019 and 2020  2 weeks ago began experiencing right wrist pain again radiating to his right thumb  Phalen's positive  Needs orthopedic referral  Already has to pending orthopedic referrals for low back and left shoulder    Assessment & Plan:  Refer to orthopedics again, this time for right wrist pain  Need to find someone to take his insurance, possibly [employer]    Orders:  -     Ambulatory referral/consult to Orthopedics; Future; Expected date: 09/16/2024  -     traMADoL (ULTRAM) 50 mg tablet; Take 1 tablet (50 mg total) by mouth every 6 (six) hours as needed for Pain.  Dispense: 20 tablet; Refill: 0    2. History of orthopedic surgery  Overview:  2006:  Lumbar diskectomy  2013:  Lumbar fusion  2019:  Left CT release  2020:  Right CT release, left ulnar release    Still having lumbar pain  Still having difficulty extending in the left elbow  Now having carpal tunnel symptoms on the right side again  Referral made to [facility] for back and left elbow are    Refer to [facility] orthopedic hand specialist for left wrist now, we will likely experience insurance troubles because they will think this is a duplicate referral               No follow-ups on file. In addition to their scheduled follow up, the patient has also been instructed to follow up on as needed basis.     Future Appointments   Date Time Provider Department Center   10/14/2024  9:20 AM LAB, [facility]   10/21/2024 11:20 AM PROVIDER, [facility] ARI Padgett, " MD

## 2024-09-25 ENCOUNTER — TELEPHONE (OUTPATIENT)
Dept: BARIATRICS | Facility: HOSPITAL | Age: 44
End: 2024-09-25
Payer: MEDICAID

## 2024-10-14 PROCEDURE — 80053 COMPREHEN METABOLIC PANEL: CPT | Performed by: FAMILY MEDICINE

## 2024-10-14 PROCEDURE — 85025 COMPLETE CBC W/AUTO DIFF WBC: CPT | Performed by: FAMILY MEDICINE

## 2024-10-14 PROCEDURE — 83036 HEMOGLOBIN GLYCOSYLATED A1C: CPT | Performed by: FAMILY MEDICINE

## 2024-10-14 PROCEDURE — 82306 VITAMIN D 25 HYDROXY: CPT | Performed by: FAMILY MEDICINE

## 2024-10-15 NOTE — PROGRESS NOTES
"Initial Consult  Edmund Roberts  Chief Complaint   Patient presents with    Consult     Interested in VSG        History of Present Illness:  Patient is a 43 y.o. male who is referred for evaluation of surgical treatment of morbid obesity. His Body mass index is 47.79 kg/m². He has known comorbidities of dyslipidemia, GERD, hypertension, and obstructive sleep apnea. He has attended the bariatric seminar and is most interested in gastric sleeve surgery. The patient has had multiple unsuccessful diet attempts in the past without sustained weight loss. With multiple unsuccessful weight loss attempts, the patient believes they are ready for surgical intervention.     Vegan for 2 years 9882-4005 and lowest weight was 215#.   Back issues, L elbow issues after     Labs (10/14/24): vitamin d: 28, A1c: 6.4%    Occupation: disabled.     Anemia/easy brusing/bleeding - [] Yes   [x] No   Smoker - [] Yes   [x] No  [] Resolved, hx of medical marijuana but does not currently smoke   Hx of blood clots - [] Yes   [x] No     Ht: 5' 8" (1.727 m) 70in  Weights:   Trend Weights BMI   Starting 314# 47   Pre-Op     2 Week     2 Month     6 Month      1 Year     2 Year               For Adults 20 Years and Older:  BMI Weight Status   Below 18.5 Underweight   18.6-24.9 Normal/Healthy   25.0-29.9 Overweight   30.0 & Above Obese     Ideal Weight Range for Your Height: 5'10" = 132 - 173 lbs     Pertinent History:  HTN - [x] Yes   [] No    [] Resolved  HLD - [x] Yes   [] No    [] Resolved  DM  -  [] Yes   [x] No    [] Resolved, prediabetes  ALICE - [x] Yes   [] No   [] Resolved, wears CPAP machine.   GERD - [x] Yes   [] No [] Resolved  Anticoagulation- [] Yes   [x] No      If yes, type: [] ASA [] Plavix [] Other    Patient Care Team:  Omega Talavera Sr., MD as PCP - General (Family Medicine)  Demond Mcrae MD as Consulting Physician (Otolaryngology)  Fran Jordan MD as Consulting Physician (Pulmonary Disease)  Surgery, " [facility] - Trauma/General (Trauma Surgery)  Rmoina Bowen MD as Consulting Physician (Cardiology)  Nicolas Campos MD as Consulting Physician (Bariatrics)  Krista Love PA (Gastroenterology)  Titus Rocha MD (Urology)     Subjective:     12 point review of systems conducted, negative except as stated in the history of present illness. See HPI for details.    Review of patient's allergies indicates:  No Known Allergies  Past Medical History:   Diagnosis Date    Arthritis     B12 deficiency     Cholesterol depletion     Elevated serum creatinine     Gastritis     GERD (gastroesophageal reflux disease)     Hypertension     Hyperuricemia     IBS (irritable bowel syndrome)     Knee pain     Mixed hyperlipidemia     Obesity, unspecified     ALICE (obstructive sleep apnea)     Plantar fasciitis, bilateral     Pre-diabetes     Prostatitis, chronic     Sleep apnea     Vitamin D3 deficiency      Past Surgical History:   Procedure Laterality Date    BACK SURGERY  2006    BACK SURGERY  2013    CARPAL TUNNEL RELEASE  2019    CHOLECYSTECTOMY  06/2018    CYST REMOVAL Left 2005    SINUS SURGERY      TONSILLECTOMY  03/2017    TRIGGER FINGER RELEASE  11/2019    vastectomy       Family History   Problem Relation Name Age of Onset    Hypertension Mother      Drug abuse Father      Hypertension Sister      Cancer Maternal Grandfather          lung     Social History     Tobacco Use    Smoking status: Never    Smokeless tobacco: Never   Substance Use Topics    Alcohol use: No    Drug use: Not Currently     Comment: medical Marijuana      Current Outpatient Medications   Medication Instructions    atorvastatin (LIPITOR) 40 mg, Daily    celecoxib (CELEBREX) 100 mg, Daily    fluticasone propionate (FLONASE) 50 mcg/actuation nasal spray fluticasone propionate 50 mcg/actuation nasal spray,suspension    hydroCHLOROthiazide (HYDRODIURIL) 25 mg, Daily    hyoscyamine 0.125 mg, Every 6 hours PRN    irbesartan  "(AVAPRO) 300 mg, Daily    ketoconazole (NIZORAL) 2 % cream 1 Application, As needed (PRN)    pantoprazole (PROTONIX) 40 mg, Daily    sucralfate (CARAFATE) 1 g, 2 times daily    tamsulosin (FLOMAX) 0.4 mg    traMADoL (ULTRAM) 50 mg, Oral, Every 6 hours PRN       Objective:     Vital Signs (Most Recent):  Visit Vitals  /84   Pulse 69   Ht 5' 8" (1.727 m)   Wt (!) 142.6 kg (314 lb 4.8 oz)   BMI 47.79 kg/m²       Physical Exam:  General:  Alert and oriented. Obese  Respiratory:  Lungs are clear to auscultation, Respirations are non-labored, Breath sounds are equal.    Cardiovascular:  Normal rate, Regular rhythm, No murmur.    Gastrointestinal:  Soft, Non-tender, Non-distended, Normal bowel sounds        Musculoskeletal:  Normal range of motion, Normal strength.    Integumentary:  Warm, Dry, Pink.    Neurologic:  Alert, Oriented.    Psychiatric:  Cooperative.      ASSESSMENT/PLAN:        1. Encounter for weight loss counseling        2. Encounter for pre-bariatric surgery counseling and education        3. Dietary counseling        4. Exercise counseling        5. Morbid obesity        6. Hypertension, unspecified type        7. Hyperlipidemia, unspecified hyperlipidemia type        8. Gastroesophageal reflux disease, unspecified whether esophagitis present        9. Pre-diabetes        10. Class 3 severe obesity due to excess calories with serious comorbidity and body mass index (BMI) of 40.0 to 44.9 in adult  Ambulatory referral/consult to Bariatric/Obesity Medicine          Plan:  Edmund Roberts is morbidly obese (Body mass index is 47.79 kg/m².) with significant weight related co-morbidity including: dyslipidemia, GERD, hypertension, and obstructive sleep apnea. The patient has been unable to lose weight and improve their co-morbid conditions with medical management including diet and exercise. He understands that this is a tool and lifestyle change will be necessary to keep weight off.     RECOMMENDATION: " Weight loss surgery. The risks, benefits, and alternatives, including gastric sleeve surgery (which the patient prefers) were discussed at length and all of their questions were answered. The patient appears to understand and wishes to proceed.    The patient was given the following instructions:  H.Pylori breat test  Once H.Pylori back will need EGD set up  6 months of medically supervised weight loss visits with the dietitian  will need clearance from Dr. Bowen, cardiology   order UGI  Request recoreds Dr. Hancock  Needs psychological evaluations and clearance.  Must attend a preoperative bariatric class.  Discussion with pt regarding steroids and NSAIDs post surgery. Will not be able to use these after surgery due to risk of ulceration, perforation, esophagitis, gastritis, bleeding, etc. Pt verbalized understanding.   The patient clearly understood that surgery would only be scheduled if there were no medical or psychiatric contraindications and a second office visit is required.

## 2024-10-17 ENCOUNTER — OFFICE VISIT (OUTPATIENT)
Dept: SURGERY | Facility: CLINIC | Age: 44
End: 2024-10-17
Payer: MEDICAID

## 2024-10-17 ENCOUNTER — CLINICAL SUPPORT (OUTPATIENT)
Dept: BARIATRICS | Facility: HOSPITAL | Age: 44
End: 2024-10-17

## 2024-10-17 VITALS
WEIGHT: 314.31 LBS | SYSTOLIC BLOOD PRESSURE: 127 MMHG | HEART RATE: 69 BPM | HEIGHT: 68 IN | BODY MASS INDEX: 47.64 KG/M2 | DIASTOLIC BLOOD PRESSURE: 84 MMHG

## 2024-10-17 VITALS — BODY MASS INDEX: 47.64 KG/M2 | HEIGHT: 68 IN | WEIGHT: 314.31 LBS

## 2024-10-17 DIAGNOSIS — E66.9 OBESITY: Primary | ICD-10-CM

## 2024-10-17 DIAGNOSIS — R73.03 PREDIABETES: ICD-10-CM

## 2024-10-17 DIAGNOSIS — E66.813 CLASS 3 SEVERE OBESITY DUE TO EXCESS CALORIES WITH SERIOUS COMORBIDITY AND BODY MASS INDEX (BMI) OF 40.0 TO 44.9 IN ADULT: ICD-10-CM

## 2024-10-17 DIAGNOSIS — R73.03 PRE-DIABETES: ICD-10-CM

## 2024-10-17 DIAGNOSIS — E66.01 CLASS 3 SEVERE OBESITY DUE TO EXCESS CALORIES WITH SERIOUS COMORBIDITY AND BODY MASS INDEX (BMI) OF 40.0 TO 44.9 IN ADULT: ICD-10-CM

## 2024-10-17 DIAGNOSIS — K21.9 GASTROESOPHAGEAL REFLUX DISEASE, UNSPECIFIED WHETHER ESOPHAGITIS PRESENT: ICD-10-CM

## 2024-10-17 DIAGNOSIS — Z71.3 ENCOUNTER FOR WEIGHT LOSS COUNSELING: Primary | ICD-10-CM

## 2024-10-17 DIAGNOSIS — E78.5 HYPERLIPIDEMIA, UNSPECIFIED HYPERLIPIDEMIA TYPE: ICD-10-CM

## 2024-10-17 DIAGNOSIS — E66.01 MORBID OBESITY: ICD-10-CM

## 2024-10-17 DIAGNOSIS — Z71.82 EXERCISE COUNSELING: ICD-10-CM

## 2024-10-17 DIAGNOSIS — I10 HYPERTENSION, UNSPECIFIED TYPE: ICD-10-CM

## 2024-10-17 DIAGNOSIS — Z71.3 DIETARY COUNSELING: ICD-10-CM

## 2024-10-17 DIAGNOSIS — E66.813 CLASS 3 SEVERE OBESITY DUE TO EXCESS CALORIES WITH SERIOUS COMORBIDITY AND BODY MASS INDEX (BMI) OF 40.0 TO 44.9 IN ADULT: Primary | ICD-10-CM

## 2024-10-17 DIAGNOSIS — E66.01 CLASS 3 SEVERE OBESITY DUE TO EXCESS CALORIES WITH SERIOUS COMORBIDITY AND BODY MASS INDEX (BMI) OF 40.0 TO 44.9 IN ADULT: Primary | ICD-10-CM

## 2024-10-17 DIAGNOSIS — Z71.89 ENCOUNTER FOR PRE-BARIATRIC SURGERY COUNSELING AND EDUCATION: ICD-10-CM

## 2024-10-17 NOTE — PROGRESS NOTES
"NUTRITIONAL CONSULT    Initial assessment for sleeve gastrectomy  Non Surgical: []    PAST HISTORY:   Dieting attempts include Other vegan and lost 45#   Highest adult weight: 315#   How many years at present wt:  fluctuates for the past couple years 300-315#   Greatest single wt loss: 45#     CLINICAL DATA:  Height:   Ht Readings from Last 1 Encounters:   24 5' 10" (1.778 m)      Weight:   Wt Readings from Last 3 Encounters:   24 1622 (!) 143 kg (315 lb 3.2 oz)   24 1434 (!) 141.5 kg (312 lb)   23 1331 (!) 140.6 kg (310 lb)      IBW: 154 lbs   BMI:   BMI Readings from Last 1 Encounters:   24 45.23 kg/m²      Bariatric goal weight (125% EBW): 192 lbs  Patient's goal weight: 200 lbs    FAMILY HISTORY OF OBESITY:  Yes           WHAT SIDE OF THE FAMILY?   []Mother   []Father   [x]Sibling   []Child     []Extended    []Adopted       Goal for Bariatric Surgery: to improve health, to improve quality of life, to lose weight, and to prevent future medical conditions    NUTRITION & HEALTH HISTORY:  Greatest challenge: dining out frequency, sweets, starchy CHO, portion control, and snacking at night    Current diet recall:   B: skip   Does not eat until the afternoon   Fried food from   Snacking on chips/ Joni  bars/ Honey bun  7pm:  rice beans  meat     Current Dietary Patterns:  Meal pattern: 2  Snackin / day Type:   Vegetables: Likes a few. Eats 2-3 times per week.  Fruits: Likes a variety. Eats almost daily.  Beverages: water  Alcohol consumption: Never. Type:    Dining out: Daily. Mostly fast food, restaurants, and take-out.  Grocery shopping and food prep: Yes[]Self   []Spouse   []Other: children    Emotional eating: Yes Which emotions if yes: bored and stress   Nighttime snacking: Yes Middle of night: No Before bedtime: Yes  his sleep schedule is all over the place and he stays up all night   Hx of disordered eating behaviors: No Anorexia: No Bulimia: No Purging: No Binging:No  History " of vitamin/mineral deficiencies:   No    Vitamins / Minerals / Herbs:   Vit D     Food Allergies:   None       ASSESSMENT:  Patient reports attempts at weight loss, only to regain lost weight.  Patient demonstrated knowledge of healthy eating behaviors and exercise patterns; admits to not eating healthy and not exercising at this point.  Patient states willingness to change lifestyle and make behavior modifications.  Expect good  compliance after surgery at this time.        ESTIMATED NEEDS:  Calories: 9246-2243 (20-25 kcal/kg adjusted BW/d)  Protein: 87-96 (1.0-1.1 g/kg adjusted BW/d)  Fluid:  1740 (20 mL/kg actual BW/d)    BARIATRIC DIET DISCUSSION:  Discussed diet after surgery and related to patients food record.  Reviewed diet progression before and after surgery.  Reinforced that surgery is not a magic bullet and importance of low fat foods and no snacking.  Answered all questions.    RECOMMENDATIONS:  Patient is a good candidate for bariatric surgery.      PLAN:eat 3 meals per day   Limit starches and fried foods   Increase water 4-6 bottles   Slow down eating   Read bariatric booklet

## 2024-10-17 NOTE — PROGRESS NOTES
BEHAVIOR MODIFICATION AND EXERCISE CONSULT    PERSONAL:     What initiated your interest in bariatric surgery?   Needs to loose weight to have back surgery.    Marital Status: [x]Single   []   []   []      Do you have children? 5    Do you have childcare issues?    What is your highest level of education completed? 12th grade    Who is your social or relational support? Mother, grandmother    Do you work? []Yes   [x]No   []Disabled   []Retired    If yes, what is your occupation?    Do you enjoy your work?     Were there any particular events that lead to significant weight gain? []Loss of a loved one  []Pregnancy  [x]Trauma, accident, illness  []Loss of employment []Other    PHYSICAL ACTIVITY:    Do you currently exercise: []Yes   [x]No    If so, please describe:    Have you experienced any injuries and/or restrictions that may limit your physical activity? [x]Yes   []No    If so, please describe: back    BEHAVIORS:    What behavior(s) would you like to change in order to be healthy? Eat healthier    On a scale of 1-10 (1-extremely low, 10-extremely high), how motivated are you to change your behavior(s)? 10    Do you currently use any type of tobacco products (vape, dip, cigarettes, etc.) No    If yes, on average, how many and/or how often do you use these products on a daily basis?    How many hours of sleep do you average? 3-5    Rate your stress level on a scale of 1-10 (1-extremely low, 10-extremely high) 8    What is your biggest life stressor? finances    How do you cope and/or manage stress? Deal with it    Is your appetite affected by stress, boredom, depression, etc.? yes    Have you ever seen a counselor or therapist? no      CURRENT WEIGHT: 314.3 HIGHEST WEIGHT: 315 LOWEST ADULT WEIGHT: 190 GOAL WEIGHT: 200    WAIST/HIP: 54/50    HANDOUTS: preop booklet    NOTES: body composition was conducted and patient was educated on results.       Antonella Jones, VINAY, CPT, CHC

## 2024-10-21 ENCOUNTER — CLINICAL SUPPORT (OUTPATIENT)
Dept: BARIATRICS | Facility: HOSPITAL | Age: 44
End: 2024-10-21

## 2024-10-21 ENCOUNTER — OFFICE VISIT (OUTPATIENT)
Dept: FAMILY MEDICINE | Facility: CLINIC | Age: 44
End: 2024-10-21
Payer: MEDICAID

## 2024-10-21 ENCOUNTER — TELEPHONE (OUTPATIENT)
Dept: FAMILY MEDICINE | Facility: CLINIC | Age: 44
End: 2024-10-21

## 2024-10-21 VITALS — HEIGHT: 68 IN | WEIGHT: 310.69 LBS | BODY MASS INDEX: 47.09 KG/M2

## 2024-10-21 VITALS
HEIGHT: 68 IN | RESPIRATION RATE: 18 BRPM | BODY MASS INDEX: 47.5 KG/M2 | DIASTOLIC BLOOD PRESSURE: 83 MMHG | HEART RATE: 83 BPM | TEMPERATURE: 98 F | WEIGHT: 313.38 LBS | SYSTOLIC BLOOD PRESSURE: 125 MMHG

## 2024-10-21 DIAGNOSIS — M54.40 CHRONIC LOW BACK PAIN WITH SCIATICA, SCIATICA LATERALITY UNSPECIFIED, UNSPECIFIED BACK PAIN LATERALITY: Primary | ICD-10-CM

## 2024-10-21 DIAGNOSIS — G89.29 CHRONIC LOW BACK PAIN WITH SCIATICA, SCIATICA LATERALITY UNSPECIFIED, UNSPECIFIED BACK PAIN LATERALITY: Primary | ICD-10-CM

## 2024-10-21 DIAGNOSIS — D64.9 ANEMIA, UNSPECIFIED TYPE: ICD-10-CM

## 2024-10-21 DIAGNOSIS — E66.01 MORBID OBESITY WITH BMI OF 45.0-49.9, ADULT: Primary | ICD-10-CM

## 2024-10-21 DIAGNOSIS — M25.531 RIGHT WRIST PAIN: ICD-10-CM

## 2024-10-21 DIAGNOSIS — R73.03 PREDIABETES: ICD-10-CM

## 2024-10-21 DIAGNOSIS — M54.2 CERVICAL PAIN (NECK): ICD-10-CM

## 2024-10-21 PROBLEM — M54.50 LOW BACK PAIN: Status: ACTIVE | Noted: 2024-10-21

## 2024-10-21 PROCEDURE — 3074F SYST BP LT 130 MM HG: CPT | Mod: CPTII,,, | Performed by: FAMILY MEDICINE

## 2024-10-21 PROCEDURE — 3044F HG A1C LEVEL LT 7.0%: CPT | Mod: CPTII,,, | Performed by: FAMILY MEDICINE

## 2024-10-21 PROCEDURE — 4010F ACE/ARB THERAPY RXD/TAKEN: CPT | Mod: CPTII,,, | Performed by: FAMILY MEDICINE

## 2024-10-21 PROCEDURE — 99214 OFFICE O/P EST MOD 30 MIN: CPT | Mod: ,,, | Performed by: FAMILY MEDICINE

## 2024-10-21 PROCEDURE — 3008F BODY MASS INDEX DOCD: CPT | Mod: CPTII,,, | Performed by: FAMILY MEDICINE

## 2024-10-21 PROCEDURE — 1159F MED LIST DOCD IN RCRD: CPT | Mod: CPTII,,, | Performed by: FAMILY MEDICINE

## 2024-10-21 PROCEDURE — 1160F RVW MEDS BY RX/DR IN RCRD: CPT | Mod: CPTII,,, | Performed by: FAMILY MEDICINE

## 2024-10-21 PROCEDURE — 94200034 HC BARIATRIC NUTRITIONAL SVCS PT GRADE I: Performed by: DIETITIAN, REGISTERED

## 2024-10-21 PROCEDURE — 3079F DIAST BP 80-89 MM HG: CPT | Mod: CPTII,,, | Performed by: FAMILY MEDICINE

## 2024-10-21 RX ORDER — TRAMADOL HYDROCHLORIDE 50 MG/1
50 TABLET ORAL EVERY 6 HOURS PRN
Qty: 20 TABLET | Refills: 0 | Status: SHIPPED | OUTPATIENT
Start: 2024-10-21

## 2024-10-21 RX ORDER — METFORMIN HYDROCHLORIDE 500 MG/1
500 TABLET, EXTENDED RELEASE ORAL
Qty: 90 TABLET | Refills: 3 | Status: SHIPPED | OUTPATIENT
Start: 2024-10-21 | End: 2025-10-21

## 2024-10-21 NOTE — PROGRESS NOTES
Family Medicine    Patient ID: [id number]     Chief Complaint: Annual Exam (Lab results ), Medication Refill (Tramadol), and Referral (Need referral for back; [facility] stated they couldn't see him for that/Also c/o right side of neck been hurting for x3 weeks )      HPI:     Edmund Roberts is a 43 y.o. male here today for a follow up.     Past Medical History:   Diagnosis Date    Arthritis     B12 deficiency     Cholesterol depletion     Elevated serum creatinine     Gastritis     GERD (gastroesophageal reflux disease)     Hypertension     Hyperuricemia     IBS (irritable bowel syndrome)     Knee pain     Mixed hyperlipidemia     Obesity, unspecified     ALICE (obstructive sleep apnea)     Plantar fasciitis, bilateral     Pre-diabetes     Prostatitis, chronic     Sleep apnea     Vitamin D3 deficiency         Past Surgical History:   Procedure Laterality Date    BACK SURGERY  2006    BACK SURGERY  2013    CARPAL TUNNEL RELEASE  2019    CHOLECYSTECTOMY  06/2018    CYST REMOVAL Left 2005    SINUS SURGERY      TONSILLECTOMY  03/2017    TRIGGER FINGER RELEASE  11/2019    vastectomy          Social History     Tobacco Use    Smoking status: Never    Smokeless tobacco: Never   Substance and Sexual Activity    Alcohol use: No    Drug use: Not Currently     Comment: medical Marijuana    Sexual activity: Yes     Partners: Female        Current Outpatient Medications   Medication Instructions    atorvastatin (LIPITOR) 40 mg, Daily    celecoxib (CELEBREX) 100 mg, Daily    fluticasone propionate (FLONASE) 50 mcg/actuation nasal spray fluticasone propionate 50 mcg/actuation nasal spray,suspension    hydroCHLOROthiazide (HYDRODIURIL) 25 mg, Daily    hyoscyamine 0.125 mg, Every 6 hours PRN    irbesartan (AVAPRO) 300 mg, Daily    ketoconazole (NIZORAL) 2 % cream 1 Application, As needed (PRN)    pantoprazole (PROTONIX) 40 mg, Daily    sucralfate (CARAFATE) 1 g, 2 times daily    tamsulosin (FLOMAX) 0.4 mg    traMADoL (ULTRAM) 50 mg,  "Oral, Every 6 hours PRN       Review of patient's allergies indicates:  No Known Allergies     Patient Care Team:  Omega Talavera Sr., MD as PCP - General (Family Medicine)  Demond Mcrae MD as Consulting Physician (Otolaryngology)  Fran Jordan MD as Consulting Physician (Pulmonary Disease)  [facility]  Romina Bowen MD as Consulting Physician (Cardiology)  Nicolas Campos MD as Consulting Physician (Bariatrics)  Krista Love PA (Gastroenterology)  Titus Rocha MD (Urology)     Subjective:     Review of Systems   Constitutional:  Negative for activity change, appetite change, fever and unexpected weight change.   HENT:  Negative for congestion, dental problem, hearing loss, rhinorrhea and trouble swallowing.    Eyes:  Negative for discharge and visual disturbance.   Respiratory:  Negative for chest tightness, shortness of breath and wheezing.    Cardiovascular:  Negative for chest pain, palpitations and leg swelling.   Gastrointestinal:  Negative for abdominal pain, blood in stool, constipation, diarrhea, nausea and vomiting.   Endocrine: Negative for polydipsia and polyuria.   Genitourinary:  Negative for difficulty urinating, hematuria and urgency.   Musculoskeletal:  Positive for arthralgias, back pain and neck pain. Negative for gait problem and joint swelling.   Skin:  Negative for rash and wound.   Neurological:  Negative for dizziness, syncope, speech difficulty, weakness, light-headedness and headaches.   Psychiatric/Behavioral:  Negative for confusion, dysphoric mood and suicidal ideas.        12 point review of systems conducted, negative except as stated in the history of present illness. See HPI for details.    Objective:     Visit Vitals  /83 (BP Location: Left arm, Patient Position: Sitting)   Pulse 83   Temp 98.2 °F (36.8 °C)   Resp 18   Ht 5' 8" (1.727 m)   Wt (!) 142.2 kg (313 lb 6.4 oz)   PF 96 " L/min   BMI 47.65 kg/m²       Physical Exam  Vitals and nursing note reviewed.   Constitutional:       General: He is not in acute distress.     Appearance: Normal appearance. He is not ill-appearing, toxic-appearing or diaphoretic.   HENT:      Head: Normocephalic and atraumatic.      Right Ear: Tympanic membrane, ear canal and external ear normal. There is no impacted cerumen.      Left Ear: Tympanic membrane, ear canal and external ear normal. There is no impacted cerumen.      Nose: No congestion or rhinorrhea.      Mouth/Throat:      Pharynx: Oropharynx is clear. No oropharyngeal exudate or posterior oropharyngeal erythema.   Eyes:      General:         Right eye: No discharge.         Left eye: No discharge.      Extraocular Movements: Extraocular movements intact.      Conjunctiva/sclera: Conjunctivae normal.   Cardiovascular:      Rate and Rhythm: Normal rate and regular rhythm.      Heart sounds: No murmur heard.     No friction rub. No gallop.   Pulmonary:      Effort: Pulmonary effort is normal. No respiratory distress.      Breath sounds: Normal breath sounds.   Musculoskeletal:      Right lower leg: No edema.      Left lower leg: No edema.   Skin:     Capillary Refill: Capillary refill takes less than 2 seconds.   Neurological:      Mental Status: He is alert and oriented to person, place, and time.   Psychiatric:         Mood and Affect: Mood normal.         Behavior: Behavior normal.         Thought Content: Thought content normal.         Labs Reviewed:     Chemistry:  Lab Results   Component Value Date     10/14/2024    K 3.9 10/14/2024    CHLORIDE 102 10/03/2022    BUN 13.0 10/14/2024    CREATININE 1.15 10/14/2024    EGFRNORACEVR >60 10/14/2024    GLUCOSE 108 (H) 10/14/2024    CALCIUM 9.4 10/14/2024    ALKPHOS 118 10/14/2024    LABPROT 7.0 10/14/2024    ALBUMIN 3.9 10/14/2024    AST 17 10/14/2024    ALT 28 10/14/2024    WFODNVCX05LO 28 (L) 10/14/2024    TSH 3.125 07/09/2024        Lab Results  "  Component Value Date    HGBA1C 6.4 10/14/2024        Hematology:  Lab Results   Component Value Date    WBC 7.62 10/14/2024    HGB 13.9 (L) 10/14/2024    HCT 42.2 10/14/2024     10/14/2024       Lipid Panel:  Lab Results   Component Value Date    CHOL 131 07/09/2024    HDL 33 (L) 07/09/2024    LDL 76.00 07/09/2024    TRIG 108 07/09/2024    TOTALCHOLEST 4 07/09/2024        Urine:  No results found for: "COLORUA", "APPEARANCEUA", "SGUA", "PHUA", "PROTEINUA", "GLUCOSEUA", "KETONESUA", "BLOODUA", "NITRITESUA", "LEUKOCYTESUR", "RBCUA", "WBCUA", "BACTERIA", "SQEPUA", "HYALINECASTS", "CREATRANDUR", "PROTEINURINE", "UPROTCREA"     Assessment:       ICD-10-CM ICD-9-CM   1. Chronic low back pain with sciatica, sciatica laterality unspecified, unspecified back pain laterality  M54.40 724.2    G89.29 724.3     338.29   2. Right wrist pain  M25.531 719.43   3. Cervical pain (neck)  M54.2 723.1   4. Prediabetes  R73.03 790.29   5. Anemia, unspecified type  D64.9 285.9        Plan:     1. Chronic low back pain with sciatica, sciatica laterality unspecified, unspecified back pain laterality  Overview:  2006:  Lumbar diskectomy  2013:  Lumbar fusion    Continues to have low back pain  Surgeon in [place] told him no further surgeries unless weight loss    Assessment & Plan:  X-ray lumbar  X-ray neck for cervical pain as well  Orthopedics referral to [facility] denied by insurance  He will follow up with his back orthopedist in [place] on November 20th  If the opinion is no surgery until bariatric weight loss we will have to provide him with short term pain medication until he has a bariatric surgery    Orders:  -     X-Ray Lumbar Complete Including Flex And Ext; Future; Expected date: 10/21/2024    2. Right wrist pain  Overview:  History of bilateral carpal tunnel releases in 2019 and 2020  Symptoms started up again 2024  Refer to orthopedic surgery, has an appointment November 20, 2024    Assessment & " Plan:  Refill tramadol  He will make his appointment in a few weeks to Orthopedics    Orders:  -     traMADoL (ULTRAM) 50 mg tablet; Take 1 tablet (50 mg total) by mouth every 6 (six) hours as needed for Pain.  Dispense: 20 tablet; Refill: 0    3. Cervical pain (neck)  -     X-Ray Cervical Spine Complete 5 view; Future; Expected date: 10/21/2024    4. Prediabetes  Overview:  A1c up from 6.1, to 6.4  No previous diagnosis of diabetes  No current medication    Assessment & Plan:  Already seeing bariatric surgery  Seeing a registered dietitian and we will work on lifestyle changes for the next 6 months  Start metformin 500 daily  Return 3 months A1c      Recommend lifestyle modifications:     ? Diet:   - Adopt a mediterranean diet, with an emphasis on vegetables, fruits (limited tropical fruits), fish, poultry, non-tropical vegetable oils, nuts; minimize red meat.  - Significantly reduce consumption of bread, pasta, rice, potatoes, sweets, and sugary drinks  - Reduce saturated fat; eliminate fried foods and trans fats; choose grilled over fried food  - Reduce ultra processed food.  If it comes in a box or wrapper it is likely processed.  - Limit your portion sizes     ? Exercise:  - Regular aerobic activity (at least 150 minutes/week of moderate-intensity activity, 75 minutes/week of vigorous intensity activity, or an equivalent combination of both)  - Resistance exercises (weights, bands, body-weight) at least two times per week    Orders:  -     Hemoglobin A1C; Future; Expected date: 01/21/2025    5. Anemia, unspecified type  -     CBC Auto Differential; Future; Expected date: 01/21/2025       Declines all vaccines  Follow up in about 3 months (around 1/21/2025) for Follow Up, With Labs Prior to Visit. In addition to their scheduled follow up, the patient has also been instructed to follow up on as needed basis.     Future Appointments   Date Time Provider Department Center   11/6/2024  8:30 AM Klarissa Sy MD  [facility]   11/18/2024  8:00 AM [facility] Castillo [facility]   1/21/2025 10:40 AM [facility]   1/28/2025 11:00 AM Omega Talavera Sr., MD [facility] Padgett MD

## 2024-10-21 NOTE — ASSESSMENT & PLAN NOTE
Already seeing bariatric surgery  Seeing a registered dietitian and we will work on lifestyle changes for the next 6 months  Start metformin 500 daily  Return 3 months A1c      Recommend lifestyle modifications:     ? Diet:   - Adopt a mediterranean diet, with an emphasis on vegetables, fruits (limited tropical fruits), fish, poultry, non-tropical vegetable oils, nuts; minimize red meat.  - Significantly reduce consumption of bread, pasta, rice, potatoes, sweets, and sugary drinks  - Reduce saturated fat; eliminate fried foods and trans fats; choose grilled over fried food  - Reduce ultra processed food.  If it comes in a box or wrapper it is likely processed.  - Limit your portion sizes     ? Exercise:  - Regular aerobic activity (at least 150 minutes/week of moderate-intensity activity, 75 minutes/week of vigorous intensity activity, or an equivalent combination of both)  - Resistance exercises (weights, bands, body-weight) at least two times per week

## 2024-10-21 NOTE — ASSESSMENT & PLAN NOTE
X-ray lumbar  X-ray neck for cervical pain as well  Orthopedics referral to [facility] denied by insurance  He will follow up with his back orthopedist in [place] on November 20th  If the opinion is no surgery until bariatric weight loss we will have to provide him with short term pain medication until he has a bariatric surgery

## 2024-10-21 NOTE — PROGRESS NOTES
"Patient Education [x] MSWL Visit Number: 1/6     []  Pre-op Wt Loss Goal (as ordered on referral):   [] NSWL Visit:     Height:   Ht Readings from Last 1 Encounters:   10/21/24 5' 8" (1.727 m)      Weight:   Wt Readings from Last 3 Encounters:   10/21/24 0804 (!) 140.9 kg (310 lb 11.2 oz)   10/17/24 0959 (!) 142.6 kg (314 lb 4.8 oz)   10/17/24 0933 (!) 142.6 kg (314 lb 4.8 oz)   10/17/24 0900 (!) 142.6 kg (314 lb 4.8 oz)      BMI:   BMI Readings from Last 1 Encounters:   10/21/24 47.24 kg/m²                                                                          Barriers to learning:  [x]None evident  []Acuity of illness  []Cognitive defects  []Cultural barriers  []Desire/Motivation  []Difficulty concentrating  []Emotional state  []Financial concerns  []Hearing deficit  []Language barrier  []Literacy  []Memory problems  []Vision impairment     Home caregiver present for session   []YES  [x] NO       Teaching methods:  []Demonstration  [x]Explanation  []Printed materials  []Teach back  []Virtual/web based         Verbalizes understanding Demonstrates  Needs further teaching Needs practice/ supervision Comments    Bariatric Surgery Diet  [] [] [] []    Clear liquid [] [] [] []    Full liquid [] [] [] []    Weight Reduction [x] [] [] []    Other Diet [] [] [] []          Additional Learner (s) Present                                                                  []Spouse   []Daughter    []  Son  []Family member   []Friend   []Grandfather   []Grandmother   []Father   []Mother   []Other       Expected Compliance:  [x]Good  []Fair  []Poor    Additional Information:    Pt starting MSWL visits. Pt has gone through pre-surgical process with a program in [place]. He is restarting the monthly visits here.       24 hr recall:  B- skipped  L- 1/2 sandwich, salad  S- smothered chicken, corn/sweet peas/white beans  Sn- chips, honey buns  Evonne- water, cranberry kiwi juice (once daily)    Plan:  Incorporate 3 meals per day " with protein at each meal.  Healthier snacks; options provided.  Daily practice with bariatric eating habits.

## 2024-10-25 ENCOUNTER — TELEPHONE (OUTPATIENT)
Dept: SURGERY | Facility: CLINIC | Age: 44
End: 2024-10-25
Payer: MEDICAID

## 2024-10-25 NOTE — TELEPHONE ENCOUNTER
Requested record, also sent an order to [facility] for UGI to be scheduled, I called scheduling and had to leave a message, no answer    Set a reminder to follow up on records and testing being scheduled

## 2024-10-25 NOTE — TELEPHONE ENCOUNTER
----- Message from Deb sent at 10/23/2024  4:08 PM CDT -----  Regarding: Testing  Pt needs UGI scheduled.   Also needs records from Dr. Hancock's office requested.

## 2024-10-30 ENCOUNTER — TELEPHONE (OUTPATIENT)
Dept: SURGERY | Facility: CLINIC | Age: 44
End: 2024-10-30
Payer: MEDICAID

## 2024-11-06 ENCOUNTER — HOSPITAL ENCOUNTER (OUTPATIENT)
Dept: RADIOLOGY | Facility: HOSPITAL | Age: 44
Discharge: HOME | End: 2024-11-06
Attending: STUDENT IN AN ORGANIZED HEALTH CARE EDUCATION/TRAINING PROGRAM
Payer: MEDICAID

## 2024-11-06 ENCOUNTER — OFFICE VISIT (OUTPATIENT)
Dept: ORTHOPEDICS | Facility: CLINIC | Age: 44
End: 2024-11-06
Payer: MEDICAID

## 2024-11-06 ENCOUNTER — TELEPHONE (OUTPATIENT)
Dept: SURGERY | Facility: CLINIC | Age: 44
End: 2024-11-06
Payer: MEDICAID

## 2024-11-06 VITALS
WEIGHT: 314.13 LBS | SYSTOLIC BLOOD PRESSURE: 135 MMHG | TEMPERATURE: 98 F | HEIGHT: 68 IN | HEART RATE: 76 BPM | DIASTOLIC BLOOD PRESSURE: 81 MMHG | BODY MASS INDEX: 47.61 KG/M2

## 2024-11-06 DIAGNOSIS — M54.50 LUMBAR PAIN: ICD-10-CM

## 2024-11-06 DIAGNOSIS — M25.529 ELBOW PAIN, UNSPECIFIED LATERALITY: ICD-10-CM

## 2024-11-06 DIAGNOSIS — M25.531 RIGHT WRIST PAIN: ICD-10-CM

## 2024-11-06 DIAGNOSIS — Z98.890 HISTORY OF ORTHOPEDIC SURGERY: ICD-10-CM

## 2024-11-06 DIAGNOSIS — G56.01 RIGHT CARPAL TUNNEL SYNDROME: Primary | ICD-10-CM

## 2024-11-06 DIAGNOSIS — M25.532 LEFT WRIST PAIN: ICD-10-CM

## 2024-11-06 PROCEDURE — 73110 X-RAY EXAM OF WRIST: CPT | Mod: TC,RT

## 2024-11-06 PROCEDURE — 73110 X-RAY EXAM OF WRIST: CPT | Mod: TC,LT

## 2024-11-06 PROCEDURE — 99215 OFFICE O/P EST HI 40 MIN: CPT | Mod: PBBFAC,25 | Performed by: STUDENT IN AN ORGANIZED HEALTH CARE EDUCATION/TRAINING PROGRAM

## 2024-11-06 PROCEDURE — 20526 THER INJECTION CARP TUNNEL: CPT | Mod: PBBFAC,RT | Performed by: STUDENT IN AN ORGANIZED HEALTH CARE EDUCATION/TRAINING PROGRAM

## 2024-11-06 RX ORDER — LIDOCAINE HYDROCHLORIDE 10 MG/ML
1 INJECTION, SOLUTION EPIDURAL; INFILTRATION; INTRACAUDAL; PERINEURAL
Status: COMPLETED | OUTPATIENT
Start: 2024-11-06 | End: 2024-11-06

## 2024-11-06 RX ORDER — TRIAMCINOLONE ACETONIDE 40 MG/ML
40 INJECTION, SUSPENSION INTRA-ARTICULAR; INTRAMUSCULAR ONCE
Status: COMPLETED | OUTPATIENT
Start: 2024-11-06 | End: 2024-11-06

## 2024-11-06 RX ADMIN — LIDOCAINE HYDROCHLORIDE 10 MG: 10 INJECTION, SOLUTION EPIDURAL; INFILTRATION; INTRACAUDAL; PERINEURAL at 01:11

## 2024-11-06 RX ADMIN — TRIAMCINOLONE ACETONIDE 40 MG: 40 INJECTION, SUSPENSION INTRA-ARTICULAR; INTRAMUSCULAR at 01:11

## 2024-11-06 NOTE — TELEPHONE ENCOUNTER
----- Message from Aj Fournier sent at 10/30/2024 10:09 AM CDT -----  Regarding: RE: FU on records/ testing  11/05/24 [facility]  ----- Message -----  From: Irlanda Sheppard MA  Sent: 10/30/2024  12:00 AM CDT  To: [facility] Staff  Subject: FU on records/ testing                           Fu on testing and and UGI being scheduled     Order was sent to [facility]

## 2024-11-06 NOTE — TELEPHONE ENCOUNTER
Let him know that is showed reflux. No hernia that was seen that would need to be fixed. Should get better and be more controlled after surgery     Proceed with surgical requirements.

## 2024-11-14 NOTE — PROGRESS NOTES
"  Subjective:    Patient ID: Edmund Roberts is a right handed 44 y.o. male  who presented to [facility] for new visit..      Chief Complaint: Pain of the Right Wrist (Patient presents for GIANLUCA wrist pain. States pain has been going on for a few month. Reports had CTS in GIANLUCA wrists prior. States pain is constant throbbing/tingling. States pain level 6/10 right now.) and Pain of the Left Wrist      History of Present Illness:  Edmund Roberts who has a history of BL CT release in 2019 and 2020  presented today with Carpal tunnel syndrome involving the bilateral hands that has been going on for years, progressively getting worse. Patient c/o numbness / tickling. [id number] flicks sign by hx. Has tried splints without significant relief. Right hand worse than the left.    Objective:      Physical Exam:    /81 (Patient Position: Sitting)   Pulse 76   Temp 97.6 °F (36.4 °C)   Ht 5' 8" (1.727 m)   Wt (!) 142.5 kg (314 lb 2.5 oz)   BMI 47.77 kg/m²     Appearance:  Soft tissue swelling: Left: no Right: no  Effusion: Left:  Negative Right: Negative  Erythema: Left no Right: no  Ecchymosis: Left: no Right: no  Atrophy: Left: no Right: no    Palpation:  Hand/wrist Tenderness: Left: none  Right: none    Range of motion: WNL    Strength: WNL      Special Tests:  Durkans Test (Carpal Compression test): Left: Positive  Right: Positive  Tinels:  Left: Positive  Right: Positive    Phalens: Left: Not performed  Right: Not performed      AIN/PIN/Ulnar nerve: Intact and symmetric    General appearance: NAD  Peripheral pulses: normal bilaterally   Reflexes: Left: Not performed Right: Not performed   Sensation: normal    Labs:  Last A1c: 6.4     Imaging:   Previous images reviewed.  X-rays ordered and performed today: yes  # of views: 3 Laterality: bilateral  My Interpretation: no fracture, dislocation, swelling or degenerative changes noted          Assessment:      " "  Encounter Diagnoses   Code Name Primary?    G56.01 Right carpal tunnel syndrome Yes    Z98.890 History of orthopedic surgery     M25.529 Elbow pain, unspecified laterality     M54.50 Lumbar pain     M25.531 Right wrist pain     M25.532 Left wrist pain         Plan:           Orders Placed This Encounter   Procedures    HME - OTHER     450-RT  Right     Order Specific Question:   Type of Equipment:     Answer:   Carpel Tunnel Brace     Order Specific Question:   Height:     Answer:   5' 8" (1.727 m)     Order Specific Question:   Weight:     Answer:   142.5 kg (314 lb 2.5 oz)    X-Ray Wrist Complete Right     Standing Status:   Future     Number of Occurrences:   1     Standing Expiration Date:   11/6/2025     Order Specific Question:   May the Radiologist modify the order per protocol to meet the clinical needs of the patient?     Answer:   Yes     Order Specific Question:   Release to patient     Answer:   Immediate    X-Ray Wrist Complete Left     Standing Status:   Future     Number of Occurrences:   1     Standing Expiration Date:   11/6/2025     Order Specific Question:   May the Radiologist modify the order per protocol to meet the clinical needs of the patient?     Answer:   Yes     Order Specific Question:   Release to patient     Answer:   Immediate    ULS US Guidance for Needle Placement     Order Specific Question:   Release to patient     Answer:   Immediate     Medications Ordered This Encounter   Medications    LIDOcaine (PF) 10 mg/ml (1%) injection 10 mg    triamcinolone acetonide injection 40 mg     Dx: RIGHT CTS    Treatment Plan:   - Discussed with patient diagnosis and treatment recommendations.   - Plain film x-rays.  - CT injection. See procedure note.  - Home physical therapy exercise handouts provided to patient.   - Over the counter NSAID and/or tylenol provided you do not have contraindications such as but not limited to liver or kidney disease or uncontrolled blood pressure. If you're " doctors have told you to to not take them based on your health, do not take them.   - Using a brace provided to you here or from your local pharmacy or durable medical equipment (DME) store.   Imaging: radiological studies ordered and independently reviewed; discussed with patient; pending radiologist interpretation.   Procedure: Discussed CSI/VSI as treatment options; discussed injections as treatment options; since conservative measures did not improve symptoms patient consented for CSI today.  Activity: Activity as tolerated  RTC: 4 wks for reeval.         Carpal Tunnel    Date/Time: 11/6/2024 8:30 AM    Performed by: Klarissa Sy MD  Authorized by: Klarissa Sy MD    Consent Done?:  Yes (Written)  Indications:  Pain and diagnostic evaluation  Site marked: the procedure site was marked    Prep: patient was prepped and draped in usual sterile fashion      Local anesthesia used?: Yes    Local anesthetic:  Topical anesthetic  Location:  Wrist  Site:  R carpal tunnel  Ultrasonic Guidance for Needle Placement?: Yes    Needle size:  25 G  Approach:  Volar  Patient tolerance:  Patient tolerated the procedure well with no immediate complications     Additional Comments:     Risks:  Possible complications with the injection include bleeding, infection (.01%), tendon rupture, steroid flare, fat pad or soft tissue atrophy, skin depigmentation, allergic reaction to medications and vasovagal response. (steroid flare treatment is rest, ice, NSAIDs and resolves in 24-36 hours.)    Consent:  No absolute contraindications (cellulitis overlying joint, infection, lack of informed consent, allergy to injection medication, AVN protein or egg allergy for sodium hyaluronate, or history of steroid flare) or relative contraindications (uncontrolled DM2 A1c>10, coagulopathy, INR > 3.5, previous joint replacement or history of AVN).        Description:  The patient was prepped in normal sterile fashion use of chlorhexidine scrub  and the appropriate and anatomic landmarks were identified with ultrasound.  Contents of syringe included: 1cc of 1% lidocaine with 40mg of Kenalog     Post Procedure: Patient alert, and moving all extremities. ROM improved, pain decreased.  Good peripheral pulses, no signs of vascular compromise and range of motion intact.  Aftercare instructions were given to patient at time of discharge.  Relative rest for 3 days-avoiding excess activity.  Place ice on the area for 15 minutes every 4-6 hours. Patient may take Tylenol a 1000 mg b.i.d. or ibuprofen 600 mg t.i.d. for the next 3-4 days if not on medication already and safe to take pending co-morbidities.  Protect the area for the next 1-8 hours if anesthetic was used.  Avoid excessive activity for the next 3-4 weeks.  ER precautions given for fever, severe joint pain or allergic reaction or other new symptoms related to the joint injection.         This note is dictated using the M*Modal Fluency Direct word recognition program. There are word recognition mistakes that are occasionally missed on review.    Klarissa Sy MD

## 2024-11-25 LAB — CRC RECOMMENDATION EXT: NORMAL

## 2024-11-26 ENCOUNTER — CLINICAL SUPPORT (OUTPATIENT)
Dept: BARIATRICS | Facility: HOSPITAL | Age: 44
End: 2024-11-26

## 2024-11-26 VITALS — BODY MASS INDEX: 46.07 KG/M2 | WEIGHT: 304 LBS | HEIGHT: 68 IN

## 2024-11-26 DIAGNOSIS — E66.01 MORBID OBESITY WITH BMI OF 45.0-49.9, ADULT: Primary | ICD-10-CM

## 2024-11-26 PROCEDURE — 94200034 HC BARIATRIC NUTRITIONAL SVCS PT GRADE I: Performed by: DIETITIAN, REGISTERED

## 2024-11-26 NOTE — PROGRESS NOTES
"Patient Education [x] [facility] Visit Number: 2/6     []  Pre-op Wt Loss Goal (as ordered on referral):   [] [facility] Visit:     Height:   Ht Readings from Last 1 Encounters:   11/26/24 5' 8" (1.727 m)      Weight:   Wt Readings from Last 3 Encounters:   11/26/24 0920 (!) 137.9 kg (304 lb)   11/06/24 0907 (!) 142.5 kg (314 lb 2.5 oz)   10/21/24 1136 (!) 142.2 kg (313 lb 6.4 oz)      BMI:   BMI Readings from Last 1 Encounters:   11/26/24 46.22 kg/m²                                                                          Barriers to learning:  [x]None evident  []Acuity of illness  []Cognitive defects  []Cultural barriers  []Desire/Motivation  []Difficulty concentrating  []Emotional state  []Financial concerns  []Hearing deficit  []Language barrier  []Literacy  []Memory problems  []Vision impairment     Home caregiver present for session   []YES  [x] NO       Teaching methods:  []Demonstration  [x]Explanation  []Printed materials  []Teach back  []Virtual/web based         Verbalizes understanding Demonstrates  Needs further teaching Needs practice/ supervision Comments    Bariatric Surgery Diet  [] [] [] []    Clear liquid [] [] [] []    Full liquid [] [] [] []    Weight Reduction [x] [] [] []    Other Diet [] [] [] []          Additional Learner (s) Present                                                                  []Spouse   []Daughter    []  Son  []Family member   []Friend   []Grandfather   []Grandmother   []Father   []Mother   []Other       Expected Compliance:  [x]Good  []Fair  []Poor    Additional Information:    Pt with 6# wt loss since last visit. Pt has incorporated breakfast meal with a protein source. Pt eating less snacks of chips, sweets. Pt states that he is working to eat less out of emotion or comfort. Pt also replacing some starchy CHOs with non-starchy vegetables. Pt working on slower eating pace, but admits to still needing work on this. Pt drinking adequate amounts of water.    Plan:  Slower eating " pace.  Continue water intake.   Utilize plate method for portioning.  Continue mindful eating strategies.  Contin

## 2024-12-12 ENCOUNTER — DOCUMENTATION ONLY (OUTPATIENT)
Dept: FAMILY MEDICINE | Facility: CLINIC | Age: 44
End: 2024-12-12
Payer: MEDICAID

## 2024-12-23 ENCOUNTER — TELEPHONE (OUTPATIENT)
Dept: SURGERY | Facility: CLINIC | Age: 44
End: 2024-12-23
Payer: MEDICAID

## 2024-12-23 ENCOUNTER — CLINICAL SUPPORT (OUTPATIENT)
Dept: BARIATRICS | Facility: HOSPITAL | Age: 44
End: 2024-12-23

## 2024-12-23 VITALS — WEIGHT: 307.5 LBS | BODY MASS INDEX: 46.76 KG/M2

## 2024-12-23 DIAGNOSIS — E66.01 MORBID OBESITY WITH BMI OF 45.0-49.9, ADULT: Primary | ICD-10-CM

## 2024-12-23 PROCEDURE — 94200034 HC BARIATRIC NUTRITIONAL SVCS PT GRADE I

## 2024-12-23 NOTE — PROGRESS NOTES
"Patient Education [x] [facility] Visit Number: 3/6     []  Pre-op Wt Loss Goal (as ordered on referral):   [] [facility] Visit:     Height:   Ht Readings from Last 1 Encounters:   11/26/24 5' 8" (1.727 m)      Weight:   Wt Readings from Last 3 Encounters:   12/23/24 1431 (!) 139.5 kg (307 lb 8 oz)   11/26/24 0920 (!) 137.9 kg (304 lb)   11/06/24 0907 (!) 142.5 kg (314 lb 2.5 oz)      BMI:   BMI Readings from Last 1 Encounters:   12/23/24 46.76 kg/m²                                                                        Barriers to learning:  [x]None evident  []Acuity of illness  []Cognitive defects  []Cultural barriers  []Desire/Motivation  []Difficulty concentrating  []Emotional state  []Financial concerns  []Hearing deficit  []Language barrier  []Literacy  []Memory problems  []Vision impairment     Home caregiver present for session   []YES  [x] NO     Teaching methods:  [x]Demonstration  []Explanation  []Printed materials  []Teach back  []Virtual/web based     Verbalizes understanding Demonstrates  Needs further teaching Needs practice/ supervision Comments    Bariatric Surgery Diet  [] [] [] []    Clear liquid [] [] [] []    Full liquid [] [] [] []    Weight Reduction [x] [] [] []    Other Diet [] [] [] []      Expected Compliance:  [x]Good  []Fair  []Poor    Additional Information:    Pt presents with a 3# weight gain for 3/6 [facility] visit. He says he has not been eating fried foods this past month and has been eating more salads. He does typically eat starches at each meal. We discussed protein snacks instead of starchy snacks and discussed measuring starches to 1/2 cup serving size to have with meals.    24 hr recall:  Breakfast: skipped   Snacks : queenie crackers   Lunch: chicken stew- few potatoes with rice  Dinner: chicken stew- few potatoes with rice     Plan:  Slowly work on cutting back on starch intake- have protein and veggies only for lunch  Start measuring starch servings- use 1/2 cup serving size  Use protein " snack list to choose high fiber/high protein snacks

## 2024-12-26 NOTE — TELEPHONE ENCOUNTER
----- Message from Lupe sent at 12/23/2024  2:59 PM CST -----  Regarding: Records Request / GI Doctor  Patient came by the office and stated that we had the wrong GI information (Dr. Hancock).   It does look like it has been updated since in his care team.   But he sees BOLIVAR Rosas / Son MD Ariana ([facility])  Phone #: [phone]    He stated that we were needing him to do a colonoscopy, but he just did a colonoscopy with them.  
Per previous message records were requested from Dr. Hancock per Deb, Looks like we got the most recent note from patients GI, and we also have the [facility] look like he has 6 dietician apps he completed 3     He still needs clearance, EGD, h.pylori and mental health screening   
noted  
No lymphadedenopathy

## 2025-01-14 ENCOUNTER — TELEPHONE (OUTPATIENT)
Dept: SURGERY | Facility: CLINIC | Age: 45
End: 2025-01-14
Payer: MEDICAID

## 2025-01-17 PROCEDURE — 80061 LIPID PANEL: CPT | Performed by: FAMILY MEDICINE

## 2025-01-17 PROCEDURE — 82306 VITAMIN D 25 HYDROXY: CPT | Performed by: FAMILY MEDICINE

## 2025-01-17 PROCEDURE — 83036 HEMOGLOBIN GLYCOSYLATED A1C: CPT | Performed by: FAMILY MEDICINE

## 2025-01-17 PROCEDURE — 84443 ASSAY THYROID STIM HORMONE: CPT | Performed by: FAMILY MEDICINE

## 2025-01-17 PROCEDURE — 80053 COMPREHEN METABOLIC PANEL: CPT | Performed by: FAMILY MEDICINE

## 2025-01-17 PROCEDURE — 85025 COMPLETE CBC W/AUTO DIFF WBC: CPT | Performed by: FAMILY MEDICINE

## 2025-01-28 ENCOUNTER — TELEPHONE (OUTPATIENT)
Dept: SURGERY | Facility: CLINIC | Age: 45
End: 2025-01-28
Payer: MEDICAID

## 2025-01-28 ENCOUNTER — CLINICAL SUPPORT (OUTPATIENT)
Dept: BARIATRICS | Facility: HOSPITAL | Age: 45
End: 2025-01-28

## 2025-01-28 VITALS — HEIGHT: 68 IN | BODY MASS INDEX: 46.77 KG/M2 | WEIGHT: 308.63 LBS

## 2025-01-28 DIAGNOSIS — E66.01 MORBID OBESITY WITH BMI OF 45.0-49.9, ADULT: Primary | ICD-10-CM

## 2025-01-28 PROCEDURE — 94200034 HC BARIATRIC NUTRITIONAL SVCS PT GRADE I

## 2025-01-28 NOTE — TELEPHONE ENCOUNTER
----- Message from Dietfernando Balderas sent at 1/28/2025 12:06 PM CST -----  Regarding: MHS referral  Pt has scheduled a MHS for 2/6/25 with Naomi Parada from the list we gave him. She is requesting a referral. Can you please send?

## 2025-01-28 NOTE — PROGRESS NOTES
"Patient Education [x] [facility] Visit Number: 4/6     []  Pre-op Wt Loss Goal (as ordered on referral):   [] [facility] Visit:     Height:   Ht Readings from Last 1 Encounters:   01/28/25 5' 8" (1.727 m)      Weight:   Wt Readings from Last 3 Encounters:   01/28/25 1157 (!) 140 kg (308 lb 9.6 oz)   12/23/24 1431 (!) 139.5 kg (307 lb 8 oz)   11/26/24 0920 (!) 137.9 kg (304 lb)      BMI:   BMI Readings from Last 1 Encounters:   01/28/25 46.92 kg/m²                                                                          Barriers to learning:  [x]None evident  []Acuity of illness  []Cognitive defects  []Cultural barriers  []Desire/Motivation  []Difficulty concentrating  []Emotional state  []Financial concerns  []Hearing deficit  []Language barrier  []Literacy  []Memory problems  []Vision impairment     Home caregiver present for session   []YES  [x] NO       Teaching methods:  []Demonstration  [x]Explanation  []Printed materials  []Teach back  []Virtual/web based         Verbalizes understanding Demonstrates  Needs further teaching Needs practice/ supervision Comments    Bariatric Surgery Diet  [] [] [] []    Clear liquid [] [] [] []    Full liquid [] [] [] []    Weight Reduction [x] [] [] []    Other Diet [] [] [] []          Additional Learner (s) Present                                                                  []Spouse   []Daughter    []  Son  []Family member   []Friend   []Grandfather   []Grandmother   []Father   []Mother   []Other       Expected Compliance:  [x]Good  []Fair  []Poor    Additional Information:    Pt's with 1# wt gain, so maintained weight essentially. Pt states he wasn't able to eat healthful consistently. Pt ate mainly 2 meals per day, breakfast and supper meals. However, pt has reduced snack intake, less cookies, chips.    24 hr recall:  B- PB cup and fruit cup; bowl of honey nut cheerios with almond milk  L- skipped  S- rice and gravy type meal  Sn- turkey jerky, fruit cups  Bevs- water, cranberry " juice (less than daily)    Plan:  Portion size all starchy CHOs at meal time to 1/2 cup.   Continue practicing bariatric eating behaviors.  Continue to include protein at meals.  3 meals per day, even if more of a snack size.

## 2025-01-28 NOTE — TELEPHONE ENCOUNTER
----- Message from Aj Manning sent at 1/28/2025 12:08 PM CST -----  Regarding: RE: MHS referral  Faxed  ----- Message -----  From: Sherrie Ventura RD  Sent: 1/28/2025  12:06 PM CST  To: Kerri Mederos MA; Irlanda Sheppard MA  Subject: MHS referral                                     Pt has scheduled a MHS for 2/6/25 with Naomi Parada from the list we gave him. She is requesting a referral. Can you please send?

## 2025-02-13 ENCOUNTER — OFFICE VISIT (OUTPATIENT)
Dept: ORTHOPEDICS | Facility: CLINIC | Age: 45
End: 2025-02-13
Payer: MEDICAID

## 2025-02-13 VITALS
DIASTOLIC BLOOD PRESSURE: 87 MMHG | HEIGHT: 68 IN | HEART RATE: 75 BPM | TEMPERATURE: 98 F | BODY MASS INDEX: 47.29 KG/M2 | SYSTOLIC BLOOD PRESSURE: 132 MMHG | WEIGHT: 312 LBS

## 2025-02-13 DIAGNOSIS — G56.03 CARPAL TUNNEL SYNDROME, BILATERAL: Primary | ICD-10-CM

## 2025-02-13 PROCEDURE — 99214 OFFICE O/P EST MOD 30 MIN: CPT | Mod: PBBFAC | Performed by: SURGERY

## 2025-02-13 PROCEDURE — 20526 THER INJECTION CARP TUNNEL: CPT | Mod: PBBFAC,LT | Performed by: SURGERY

## 2025-02-13 RX ORDER — LIDOCAINE HYDROCHLORIDE 10 MG/ML
1 INJECTION, SOLUTION EPIDURAL; INFILTRATION; INTRACAUDAL; PERINEURAL
Status: COMPLETED | OUTPATIENT
Start: 2025-02-13 | End: 2025-02-13

## 2025-02-13 RX ORDER — TRIAMCINOLONE ACETONIDE 40 MG/ML
40 INJECTION, SUSPENSION INTRA-ARTICULAR; INTRAMUSCULAR ONCE
Status: COMPLETED | OUTPATIENT
Start: 2025-02-13 | End: 2025-02-13

## 2025-02-13 RX ADMIN — TRIAMCINOLONE ACETONIDE 40 MG: 40 INJECTION, SUSPENSION INTRA-ARTICULAR; INTRAMUSCULAR at 08:02

## 2025-02-13 RX ADMIN — LIDOCAINE HYDROCHLORIDE 10 MG: 10 INJECTION, SOLUTION EPIDURAL; INFILTRATION; INTRACAUDAL; PERINEURAL at 08:02

## 2025-02-25 ENCOUNTER — OFFICE VISIT (OUTPATIENT)
Dept: FAMILY MEDICINE | Facility: CLINIC | Age: 45
End: 2025-02-25
Payer: MEDICAID

## 2025-02-25 ENCOUNTER — CLINICAL SUPPORT (OUTPATIENT)
Dept: BARIATRICS | Facility: HOSPITAL | Age: 45
End: 2025-02-25

## 2025-02-25 VITALS
TEMPERATURE: 98 F | HEART RATE: 74 BPM | WEIGHT: 312 LBS | SYSTOLIC BLOOD PRESSURE: 120 MMHG | BODY MASS INDEX: 47.29 KG/M2 | HEIGHT: 68 IN | DIASTOLIC BLOOD PRESSURE: 90 MMHG | RESPIRATION RATE: 20 BRPM | OXYGEN SATURATION: 95 %

## 2025-02-25 VITALS — BODY MASS INDEX: 46.98 KG/M2 | WEIGHT: 309 LBS

## 2025-02-25 DIAGNOSIS — E66.01 CLASS 3 SEVERE OBESITY DUE TO EXCESS CALORIES WITH SERIOUS COMORBIDITY AND BODY MASS INDEX (BMI) OF 45.0 TO 49.9 IN ADULT: ICD-10-CM

## 2025-02-25 DIAGNOSIS — E66.01 MORBID OBESITY WITH BMI OF 45.0-49.9, ADULT: Primary | ICD-10-CM

## 2025-02-25 DIAGNOSIS — H92.01 OTALGIA OF RIGHT EAR: ICD-10-CM

## 2025-02-25 DIAGNOSIS — I10 PRIMARY HYPERTENSION: ICD-10-CM

## 2025-02-25 DIAGNOSIS — E66.813 CLASS 3 SEVERE OBESITY DUE TO EXCESS CALORIES WITH SERIOUS COMORBIDITY AND BODY MASS INDEX (BMI) OF 45.0 TO 49.9 IN ADULT: ICD-10-CM

## 2025-02-25 DIAGNOSIS — R73.03 PREDIABETES: Primary | ICD-10-CM

## 2025-02-25 PROCEDURE — 3008F BODY MASS INDEX DOCD: CPT | Mod: CPTII,,,

## 2025-02-25 PROCEDURE — 3080F DIAST BP >= 90 MM HG: CPT | Mod: CPTII,,,

## 2025-02-25 PROCEDURE — 1160F RVW MEDS BY RX/DR IN RCRD: CPT | Mod: CPTII,,,

## 2025-02-25 PROCEDURE — 99214 OFFICE O/P EST MOD 30 MIN: CPT | Mod: ,,,

## 2025-02-25 PROCEDURE — 3044F HG A1C LEVEL LT 7.0%: CPT | Mod: CPTII,,,

## 2025-02-25 PROCEDURE — 94200034 HC BARIATRIC NUTRITIONAL SVCS PT GRADE I

## 2025-02-25 PROCEDURE — 1159F MED LIST DOCD IN RCRD: CPT | Mod: CPTII,,,

## 2025-02-25 PROCEDURE — 3074F SYST BP LT 130 MM HG: CPT | Mod: CPTII,,,

## 2025-02-25 RX ORDER — DICLOFENAC SODIUM 75 MG/1
75 TABLET, DELAYED RELEASE ORAL 2 TIMES DAILY
COMMUNITY
Start: 2025-02-15

## 2025-02-25 RX ORDER — IRBESARTAN 300 MG/1
300 TABLET ORAL DAILY
COMMUNITY

## 2025-02-25 RX ORDER — HYDROCHLOROTHIAZIDE 50 MG/1
50 TABLET ORAL DAILY
Qty: 30 TABLET | Refills: 2 | Status: SHIPPED | OUTPATIENT
Start: 2025-02-25 | End: 2025-05-26

## 2025-02-25 RX ORDER — CIPROFLOXACIN AND DEXAMETHASONE 3; 1 MG/ML; MG/ML
4 SUSPENSION/ DROPS AURICULAR (OTIC)
COMMUNITY
Start: 2025-02-15

## 2025-02-25 RX ORDER — AMOXICILLIN AND CLAVULANATE POTASSIUM 875; 125 MG/1; MG/1
1 TABLET, FILM COATED ORAL 2 TIMES DAILY
COMMUNITY
End: 2025-02-25

## 2025-02-25 NOTE — PROGRESS NOTES
Patient ID: [id number]     Chief Complaint: Follow-up (Lab results )      HPI:   Edmund Roberts is a 44 year old male who presents today for lab results follow up.    LABS:  A1c improved to 6.3 from previous value of 6.4 in October. Thyroid level was normal.    ENT:  He reports persistent right ear symptoms following a recent ear infection and post nasal drip. He has a history of two sinus surgeries.    SURGICAL PLANNING:  He has bariatric surgery education scheduled for March 26.        Past Medical History:   Diagnosis Date    Arthritis     B12 deficiency     Cholesterol depletion     Elevated serum creatinine     Gastritis     GERD (gastroesophageal reflux disease)     Hypertension     Hyperuricemia     IBS (irritable bowel syndrome)     Knee pain     Mixed hyperlipidemia     Obesity, unspecified     ALICE (obstructive sleep apnea)     Plantar fasciitis, bilateral     Pre-diabetes     Prostatitis, chronic     Sleep apnea     Vitamin D3 deficiency         Past Surgical History:   Procedure Laterality Date    BACK SURGERY  2006    BACK SURGERY  2013    CARPAL TUNNEL RELEASE  2019    CHOLECYSTECTOMY  06/2018    CYST REMOVAL Left 2005    SINUS SURGERY      SPINE SURGERY      TONSILLECTOMY  03/2017    TRIGGER FINGER RELEASE  11/2019    VASECTOMY      vastectomy          Social History     Socioeconomic History    Marital status: Single    Number of children: 5   Tobacco Use    Smoking status: Never    Smokeless tobacco: Never   Substance and Sexual Activity    Alcohol use: No    Drug use: Not Currently     Comment: medical Marijuana    Sexual activity: Not Currently     Partners: Female     Birth control/protection: Abstinence     Social Drivers of Health     Financial Resource Strain: Medium Risk (2/25/2025)    Overall Financial Resource Strain (CARDIA)     Difficulty of Paying Living Expenses: Somewhat hard   Food Insecurity: No Food Insecurity (2/25/2025)    Hunger Vital Sign     Worried About Running Out of Food  in the Last Year: Never true     Ran Out of Food in the Last Year: Never true   Transportation Needs: No Transportation Needs (2/25/2025)    PRAPARE - Transportation     Lack of Transportation (Medical): No     Lack of Transportation (Non-Medical): No   Physical Activity: Inactive (2/25/2025)    Exercise Vital Sign     Days of Exercise per Week: 0 days     Minutes of Exercise per Session: 0 min   Stress: No Stress Concern Present (2/25/2025)    [employer] [place] [employer] - Occupational Stress Questionnaire     Feeling of Stress : Not at all   Housing Stability: Low Risk  (2/25/2025)    Housing Stability Vital Sign     Unable to Pay for Housing in the Last Year: No     Homeless in the Last Year: No        Current Outpatient Medications   Medication Instructions    atorvastatin (LIPITOR) 40 mg, Daily    ciprofloxacin-dexAMETHasone 0.3-0.1% (CIPRODEX) 0.3-0.1 % DrpS 4 drops    diclofenac (VOLTAREN) 75 mg, 2 times daily    hydroCHLOROthiazide (HYDRODIURIL) 50 mg, Oral, Daily    irbesartan (AVAPRO) 300 mg, Oral, Daily    ketoconazole (NIZORAL) 2 % cream 1 Application, As needed (PRN)    metFORMIN (GLUCOPHAGE-XR) 500 mg, Oral, With breakfast    pantoprazole (PROTONIX) 40 mg, Daily    tamsulosin (FLOMAX) 0.4 mg       Review of patient's allergies indicates:  No Known Allergies     Patient Care Team:  Omega Talavera Sr., MD as PCP - General (Family Medicine)  Demond Mcrae MD as Consulting Physician (Otolaryngology)  Fran Jordan MD as Consulting Physician (Pulmonary Disease)  [facility]  Romina Bowen MD as Consulting Physician (Cardiology)  Nicloas Campos MD as Consulting Physician (Bariatrics)  Krista Love PA (Gastroenterology)  Titus Rocha MD (Urology)  Naomi Parada, VIVIANA (Psychiatry)     Subjective:     Review of Systems    12 point review of systems conducted, negative except as stated in the history of  "present illness. See HPI for details.    Objective:     Visit Vitals  BP (!) 120/90 (BP Location: Left arm, Patient Position: Sitting)   Pulse 74   Temp 97.8 °F (36.6 °C)   Resp 20   Ht 5' 8" (1.727 m)   Wt (!) 141.5 kg (312 lb)   SpO2 95%   BMI 47.44 kg/m²     Physical Exam    General: No acute distress. Well-developed. Well-nourished.  Eyes: EOMI. Sclerae anicteric.  HENT: Normocephalic. Atraumatic. Nares patent. Moist oral mucosa.  Ears: Bilateral TMs clear. Bilateral EACs clear. Middle ear erythema.  Cardiovascular: Regular rate. Regular rhythm. No murmurs. No rubs. No gallops. Normal S1, S2.  Respiratory: Normal respiratory effort. Clear to auscultation bilaterally. No rales. No rhonchi. No wheezing.  Abdomen: Soft. Non-tender. Non-distended. Normoactive bowel sounds.  Musculoskeletal: No  obvious deformity.  Extremities: No lower extremity edema.  Neurological: Alert & oriented x3. No slurred speech. Normal gait.  Psychiatric: Normal mood. Normal affect. Good insight. Good judgment.  Skin: Warm. Dry. No rash.         Labs Reviewed:     Chemistry:  Lab Results   Component Value Date     01/17/2025    K 4.0 01/17/2025    BUN 11.9 01/17/2025    CREATININE 1.16 01/17/2025    EGFRNORACEVR >60 01/17/2025    GLUCOSE 104 (H) 01/17/2025    CALCIUM 9.2 01/17/2025    ALKPHOS 131 01/17/2025    LABPROT 7.3 01/17/2025    ALBUMIN 4.1 01/17/2025    AST 18 01/17/2025    ALT 26 01/17/2025    [id number] 32 01/17/2025    TSH 2.243 01/17/2025        Lab Results   Component Value Date    HGBA1C 6.3 01/17/2025        Hematology:  Lab Results   Component Value Date    WBC 8.09 01/17/2025    HGB 14.3 01/17/2025    HCT 43.5 01/17/2025     01/17/2025       Lipid Panel:  Lab Results   Component Value Date    CHOL 132 01/17/2025    HDL 30 (L) 01/17/2025    LDL 78.00 01/17/2025    TRIG 121 01/17/2025    TOTALCHOLEST 4 01/17/2025     Assessment:       ICD-10-CM ICD-9-CM   1. Prediabetes  R73.03 790.29   2. Primary " hypertension  I10 401.9   3. Class 3 severe obesity due to excess calories with serious comorbidity and body mass index (BMI) of 45.0 to 49.9 in adult  E66.813 278.01    Z68.42 V85.42    E66.01    4. Otalgia of right ear  H92.01 388.70     Plan:     1. Prediabetes  Overview:  10/2024 - A1c up from 6.1, to 6.4; No previous diagnosis of diabetes;No current medication.    10/2024 - Start Metformin  mg daily; Already seeing bariatric surgery; Seeing a registered dietitian and we will work on lifestyle changes for the next 6 months.    Assessment & Plan:  Monitored A1c level, current value is 6.3%, improved from 6.4% in October.  Continued metformin 500mg for prediabetes management.      2. Primary hypertension  Overview:  Current Medications prior to this visit:   Irbesartan 300 mg daily + HCTZ 25 mg daily.     Followed by Cardiology.    Assessment & Plan:  Monitored the patient's blood pressure, noting a high diastolic reading of 91.  Assessed that diastolic blood pressure remains high and not at goal.    Increased hydrochlorothiazide dosage from 25mg to 50mg daily.  Instructed the patient to check blood pressure twice daily: once 1 hour after taking medication and once at night.  Advised the patient to record blood pressure readings and bring to next appointment.  Recommend increasing water intake to help lower blood pressure.    Scheduled follow-up visit in 1 month with CMP to check kidney function.  Follow up in 1 month.    Orders:  -     hydroCHLOROthiazide (HYDRODIURIL) 50 MG tablet; Take 1 tablet (50 mg total) by mouth once daily.  Dispense: 30 tablet; Refill: 2  -     Comprehensive Metabolic Panel; Future; Expected date: 03/25/2025    3. Class 3 severe obesity due to excess calories with serious comorbidity and body mass index (BMI) of 45.0 to 49.9 in adult  Overview:  Body mass index is 47.44 kg/m².      Assessment & Plan:  BMI has increased since last visit.   Goal BMI <30.    Exercise 5 times a week for  30 minutes per day.  Avoid soda, simple sugars, excessive rice, potatoes or bread. Limit fast foods and fried foods.  Choose complex carbs in moderation (example: green vegetables, beans, oatmeal). Eat plenty of fresh fruits and vegetables with lean meats daily.  Do not skip meals. Eat a balanced portion size.  Avoid fad diets. Consider permanent healthy life style changes.     Patient currently being managed by Bariatric surgery with surgery in the near future.         4. Otalgia of right ear  Assessment & Plan:  Explained post-nasal drip may be contributing to ear issues.  Suggested daily antihistamine use for symptom management.    Patient would like to see ENT. Referral ordered.     Orders:  -     Ambulatory referral/consult to ENT; Future; Expected date: 02/25/2025      Follow up in about 1 month (around 3/25/2025) for HTN Follow Up. In addition to their scheduled follow up, the patient has also been instructed to follow up on as needed basis.     This note was generated with the assistance of ambient listening technology. Verbal consent was obtained by the patient and accompanying visitor(s) for the recording of patient appointment to facilitate this note. I attest to having reviewed and edited the generated note for accuracy, though some syntax or spelling errors may persist. Please contact the author of this note for any clarification.      Ghanshyam Arndt, Adult-Gerontology NP

## 2025-02-25 NOTE — PROGRESS NOTES
"Patient Education [x] [facility]: 5[facility]6     []  Pre-op Wt Loss Goal (as ordered on referral):   [] [facility] Visit:     Height:   Ht Readings from Last 1 Encounters:   02/13/25 5' 8" (1.727 m)      Weight:   Wt Readings from Last 3 Encounters:   02/25/25 0840 (!) 140.2 kg (309 lb)   02/13/25 0817 (!) 141.5 kg (312 lb)   01/28/25 1157 (!) 140 kg (308 lb 9.6 oz)      BMI:   BMI Readings from Last 1 Encounters:   02/25/25 46.98 kg/m²                                                                        Barriers to learning:  [x]None evident  []Acuity of illness  []Cognitive defects  []Cultural barriers  []Desire/Motivation  []Difficulty concentrating  []Emotional state  []Financial concerns  []Hearing deficit  []Language barrier  []Literacy  []Memory problems  []Vision impairment     Home caregiver present for session   []YES  [x] NO     Teaching methods:  [x]Demonstration  [x]Explanation  []Printed materials  []Teach back  []Virtual/web based     Verbalizes understanding Demonstrates  Needs further teaching Needs practice/ supervision Comments    Bariatric Surgery Diet  [] [] [] []    Clear liquid [] [] [] []    Full liquid [] [] [] []    Weight Reduction [] [] [] []    Other Diet [] [] [] []      Expected Compliance:  []Good  [x]Fair  []Poor    Additional Information:    Pt presents with 1# weight gain since last month. Pt reports a rough month and he has been struggling to make any changes in his diet regimen. Has been drinking only water and using daughter's plate to attempt to reduce portion sizes but admits to not measuring out anything. We discussed importance of making changes in diet and starting to prep meals ahead to practice for surgery. Pt understood. Gave him modified pre-op diet to help portion out meals and rec'd a few clear protein powders to try to drink for after surgery or for meal replacement now.     Plan:  Use mod pre op diet handout to start measuring out correct portion sizes and prepping " meals ahead  Don't skip meals- eat 3 meals daily with lean protein source at each meal  Try clear protein options to supplement- Oath Protein brand or Seeq protein brand  Continue drinking water only -  oz daily

## 2025-02-25 NOTE — ASSESSMENT & PLAN NOTE
Monitored the patient's blood pressure, noting a high diastolic reading of 91.  Assessed that diastolic blood pressure remains high and not at goal.    Increased hydrochlorothiazide dosage from 25mg to 50mg daily.  Instructed the patient to check blood pressure twice daily: once 1 hour after taking medication and once at night.  Advised the patient to record blood pressure readings and bring to next appointment.  Recommend increasing water intake to help lower blood pressure.    Scheduled follow-up visit in 1 month with CMP to check kidney function.  Follow up in 1 month.

## 2025-02-25 NOTE — ASSESSMENT & PLAN NOTE
BMI has increased since last visit.   Goal BMI <30.    Exercise 5 times a week for 30 minutes per day.  Avoid soda, simple sugars, excessive rice, potatoes or bread. Limit fast foods and fried foods.  Choose complex carbs in moderation (example: green vegetables, beans, oatmeal). Eat plenty of fresh fruits and vegetables with lean meats daily.  Do not skip meals. Eat a balanced portion size.  Avoid fad diets. Consider permanent healthy life style changes.     Patient currently being managed by Bariatric surgery with surgery in the near future.

## 2025-02-25 NOTE — ASSESSMENT & PLAN NOTE
Explained post-nasal drip may be contributing to ear issues.  Suggested daily antihistamine use for symptom management.    Patient would like to see ENT. Referral ordered.

## 2025-02-25 NOTE — ASSESSMENT & PLAN NOTE
Monitored A1c level, current value is 6.3%, improved from 6.4% in October.  Continued metformin 500mg for prediabetes management.

## 2025-03-10 ENCOUNTER — PATIENT MESSAGE (OUTPATIENT)
Dept: SURGERY | Facility: CLINIC | Age: 45
End: 2025-03-10
Payer: MEDICAID

## 2025-03-12 ENCOUNTER — RESULTS FOLLOW-UP (OUTPATIENT)
Dept: FAMILY MEDICINE | Facility: CLINIC | Age: 45
End: 2025-03-12

## 2025-03-12 PROCEDURE — 80053 COMPREHEN METABOLIC PANEL: CPT

## 2025-03-14 ENCOUNTER — LAB VISIT (OUTPATIENT)
Dept: LAB | Facility: HOSPITAL | Age: 45
End: 2025-03-14
Attending: SURGERY
Payer: MEDICAID

## 2025-03-14 DIAGNOSIS — K27.9 PEPTIC ULCER: ICD-10-CM

## 2025-03-14 DIAGNOSIS — K27.9 PEPTIC ULCER: Primary | ICD-10-CM

## 2025-03-14 PROCEDURE — 83013 H PYLORI (C-13) BREATH: CPT

## 2025-03-15 LAB — UREA BREATH TEST QL: POSITIVE

## 2025-03-17 ENCOUNTER — RESULTS FOLLOW-UP (OUTPATIENT)
Dept: SURGERY | Facility: HOSPITAL | Age: 45
End: 2025-03-17
Payer: MEDICAID

## 2025-03-17 ENCOUNTER — OFFICE VISIT (OUTPATIENT)
Dept: FAMILY MEDICINE | Facility: CLINIC | Age: 45
End: 2025-03-17
Payer: MEDICAID

## 2025-03-17 VITALS
WEIGHT: 308 LBS | TEMPERATURE: 98 F | HEART RATE: 84 BPM | BODY MASS INDEX: 46.68 KG/M2 | SYSTOLIC BLOOD PRESSURE: 125 MMHG | OXYGEN SATURATION: 97 % | HEIGHT: 68 IN | DIASTOLIC BLOOD PRESSURE: 75 MMHG | RESPIRATION RATE: 18 BRPM

## 2025-03-17 DIAGNOSIS — I10 PRIMARY HYPERTENSION: Primary | ICD-10-CM

## 2025-03-17 DIAGNOSIS — A04.8 H. PYLORI INFECTION: Primary | ICD-10-CM

## 2025-03-17 PROCEDURE — 3044F HG A1C LEVEL LT 7.0%: CPT | Mod: CPTII,,, | Performed by: FAMILY MEDICINE

## 2025-03-17 PROCEDURE — 99214 OFFICE O/P EST MOD 30 MIN: CPT | Mod: ,,, | Performed by: FAMILY MEDICINE

## 2025-03-17 PROCEDURE — 3008F BODY MASS INDEX DOCD: CPT | Mod: CPTII,,, | Performed by: FAMILY MEDICINE

## 2025-03-17 PROCEDURE — 1159F MED LIST DOCD IN RCRD: CPT | Mod: CPTII,,, | Performed by: FAMILY MEDICINE

## 2025-03-17 PROCEDURE — 4010F ACE/ARB THERAPY RXD/TAKEN: CPT | Mod: CPTII,,, | Performed by: FAMILY MEDICINE

## 2025-03-17 PROCEDURE — 3078F DIAST BP <80 MM HG: CPT | Mod: CPTII,,, | Performed by: FAMILY MEDICINE

## 2025-03-17 PROCEDURE — 3074F SYST BP LT 130 MM HG: CPT | Mod: CPTII,,, | Performed by: FAMILY MEDICINE

## 2025-03-17 RX ORDER — MELOXICAM 15 MG/1
15 TABLET ORAL
COMMUNITY
Start: 2025-02-24

## 2025-03-17 RX ORDER — METRONIDAZOLE 500 MG/1
500 TABLET ORAL 2 TIMES DAILY
Qty: 28 TABLET | Refills: 0 | Status: SHIPPED | OUTPATIENT
Start: 2025-03-17 | End: 2025-03-31

## 2025-03-17 RX ORDER — CLARITHROMYCIN 500 MG/1
500 TABLET, FILM COATED ORAL 2 TIMES DAILY
Qty: 28 TABLET | Refills: 0 | Status: SHIPPED | OUTPATIENT
Start: 2025-03-17 | End: 2025-03-31

## 2025-03-17 RX ORDER — AMOXICILLIN 500 MG/1
1000 TABLET, FILM COATED ORAL 2 TIMES DAILY
Qty: 56 TABLET | Refills: 0 | Status: SHIPPED | OUTPATIENT
Start: 2025-03-17 | End: 2025-03-31

## 2025-03-17 RX ORDER — OMEPRAZOLE 20 MG/1
20 CAPSULE, DELAYED RELEASE ORAL 2 TIMES DAILY
Qty: 28 CAPSULE | Refills: 0 | Status: SHIPPED | OUTPATIENT
Start: 2025-03-17 | End: 2025-03-31

## 2025-03-17 NOTE — ASSESSMENT & PLAN NOTE
Normal today   Some home pressures are elevated, declines medication adjustment   He will stay on the HCTZ 25 mg daily  Return 3 months for recheck and lab

## 2025-03-17 NOTE — PROGRESS NOTES
H Pylori positive. Please contact patient with results and call in H Pylori eradication antibiotics per protocol. No drug allergies listed on chart. Please let me know if patient has any additional questions or concerns.

## 2025-03-17 NOTE — PROGRESS NOTES
Family Medicine    Patient ID: [id number]     Chief Complaint: Follow-up      HPI:     Edmund Roberts is a 44 y.o. male here today for for BP recheck.  His diastolic pressure was elevated a month ago so his HCTZ was changed from 25 mg to 50 mg.  He has not started taking the 50 mg yet in his pressure was normal today.  He brings blood pressure logs from home where the majority of his pressures are also normal.  We discussed my preference to not increase HCTZ higher than 25 mg due to diminishing returns of efficacy with increased side-effects.  He declined any modification of his blood pressure medications today and we would like to come back in three-month    Past Medical History:   Diagnosis Date    Arthritis     B12 deficiency     Cholesterol depletion     Elevated serum creatinine     Gastritis     GERD (gastroesophageal reflux disease)     Hypertension     Hyperuricemia     IBS (irritable bowel syndrome)     Knee pain     Mixed hyperlipidemia     Obesity, unspecified     ALICE (obstructive sleep apnea)     Plantar fasciitis, bilateral     Pre-diabetes     Prostatitis, chronic     Sleep apnea     Vitamin D3 deficiency         Past Surgical History:   Procedure Laterality Date    BACK SURGERY  2006    BACK SURGERY  2013    CARPAL TUNNEL RELEASE  2019    CHOLECYSTECTOMY  06/2018    CYST REMOVAL Left 2005    SINUS SURGERY      SPINE SURGERY      TONSILLECTOMY  03/2017    TRIGGER FINGER RELEASE  11/2019    VASECTOMY      vastectomy          Social History     Tobacco Use    Smoking status: Never    Smokeless tobacco: Never   Substance and Sexual Activity    Alcohol use: No    Drug use: Not Currently     Comment: medical Marijuana    Sexual activity: Not Currently     Partners: Female     Birth control/protection: Abstinence        Current Outpatient Medications   Medication Instructions    amoxicillin (AMOXIL) 1,000 mg, Oral, 2 times daily    atorvastatin (LIPITOR) 40 mg, Daily    ciprofloxacin-dexAMETHasone  0.3-0.1% (CIPRODEX) 0.3-0.1 % DrpS 4 drops    clarithromycin (BIAXIN) 500 mg, Oral, 2 times daily    diclofenac (VOLTAREN) 75 mg, 2 times daily    hydroCHLOROthiazide (HYDRODIURIL) 50 mg, Oral, Daily    irbesartan (AVAPRO) 300 mg, Daily    ketoconazole (NIZORAL) 2 % cream 1 Application, As needed (PRN)    meloxicam (MOBIC) 15 mg    metFORMIN (GLUCOPHAGE-XR) 500 mg, Oral, With breakfast    metroNIDAZOLE (FLAGYL) 500 mg, Oral, 2 times daily    omeprazole (PRILOSEC) 20 mg, Oral, 2 times daily    pantoprazole (PROTONIX) 40 mg, Daily    tamsulosin (FLOMAX) 0.4 mg       Review of patient's allergies indicates:  No Known Allergies     Patient Care Team:  Omega Talavera Sr., MD as PCP - General (Family Medicine)  Demond Mcrae MD as Consulting Physician (Otolaryngology)  Fran Jordan MD as Consulting Physician (Pulmonary Disease)  [facility]  Romina Bowen MD as Consulting Physician (Cardiology)  Nicolas Campos MD as Consulting Physician (Bariatrics)  Krsita Love PA (Gastroenterology)  Titus Rocha MD (Urology)  Tal April, NP (Psychiatry)     Subjective:     Review of Systems   Constitutional:  Negative for activity change, appetite change, fever and unexpected weight change.   HENT:  Negative for congestion, dental problem, rhinorrhea and trouble swallowing.    Eyes:  Negative for visual disturbance.   Respiratory:  Negative for chest tightness and shortness of breath.    Cardiovascular:  Negative for chest pain, palpitations and leg swelling.   Gastrointestinal:  Negative for abdominal pain, blood in stool, constipation, diarrhea, nausea and vomiting.   Genitourinary:  Negative for difficulty urinating.   Musculoskeletal:  Negative for gait problem.   Skin:  Negative for rash and wound.   Neurological:  Negative for dizziness, syncope, speech difficulty, weakness and light-headedness.   Psychiatric/Behavioral:  Negative  "for confusion and suicidal ideas.        12 point review of systems conducted, negative except as stated in the history of present illness. See HPI for details.    Objective:     Visit Vitals  /75   Pulse 84   Temp 98.1 °F (36.7 °C) (Skin)   Resp 18   Ht 5' 8" (1.727 m)   Wt (!) 139.7 kg (308 lb)   SpO2 97%   BMI 46.83 kg/m²       Physical Exam  Vitals and nursing note reviewed.   Constitutional:       General: He is not in acute distress.     Appearance: Normal appearance. He is not ill-appearing, toxic-appearing or diaphoretic.   HENT:      Head: Normocephalic and atraumatic.      Right Ear: Tympanic membrane, ear canal and external ear normal. There is no impacted cerumen.      Left Ear: Tympanic membrane, ear canal and external ear normal. There is no impacted cerumen.      Nose: No congestion or rhinorrhea.      Mouth/Throat:      Pharynx: Oropharynx is clear. No oropharyngeal exudate or posterior oropharyngeal erythema.   Eyes:      General:         Right eye: No discharge.         Left eye: No discharge.      Extraocular Movements: Extraocular movements intact.      Conjunctiva/sclera: Conjunctivae normal.   Cardiovascular:      Rate and Rhythm: Normal rate and regular rhythm.      Heart sounds: No murmur heard.     No friction rub. No gallop.   Pulmonary:      Effort: Pulmonary effort is normal. No respiratory distress.      Breath sounds: Normal breath sounds.   Musculoskeletal:      Right lower leg: No edema.      Left lower leg: No edema.   Skin:     Capillary Refill: Capillary refill takes less than 2 seconds.   Neurological:      Mental Status: He is alert and oriented to person, place, and time.   Psychiatric:         Mood and Affect: Mood normal.         Behavior: Behavior normal.         Thought Content: Thought content normal.         Labs Reviewed:     Chemistry:  Lab Results   Component Value Date     03/12/2025    K 4.0 03/12/2025    BUN 9.7 03/12/2025    CREATININE 1.18 03/12/2025    " "EGFRNORACEVR >60 03/12/2025    GLUCOSE 96 03/12/2025    CALCIUM 9.6 03/12/2025    ALKPHOS 128 03/12/2025    LABPROT 7.5 03/12/2025    ALBUMIN 4.0 03/12/2025    AST 15 03/12/2025    ALT 25 03/12/2025    [id number] 32 01/17/2025    TSH 2.243 01/17/2025        Lab Results   Component Value Date    HGBA1C 6.3 01/17/2025        Hematology:  Lab Results   Component Value Date    WBC 8.09 01/17/2025    HGB 14.3 01/17/2025    HCT 43.5 01/17/2025     01/17/2025       Lipid Panel:  Lab Results   Component Value Date    CHOL 132 01/17/2025    HDL 30 (L) 01/17/2025    LDL 78.00 01/17/2025    TRIG 121 01/17/2025    TOTALCHOLEST 4 01/17/2025        Urine:  No results found for: "COLORUA", "APPEARANCEUA", "SGUA", "PHUA", "PROTEINUA", "GLUCOSEUA", "KETONESUA", "BLOODUA", "NITRITESUA", "LEUKOCYTESUR", "RBCUA", "WBCUA", "BACTERIA", "SQEPUA", "HYALINECASTS", "CREATRANDUR", "PROTEINURINE", "UPROTCREA"     Assessment:       ICD-10-CM ICD-9-CM   1. Primary hypertension  I10 401.9        Plan:     1. Primary hypertension  Overview:  Current Medications prior to this visit:   Irbesartan 300 mg daily + HCTZ 25 mg daily.     Followed by Cardiology.    Assessment & Plan:  Normal today   Some home pressures are elevated, declines medication adjustment   He will stay on the HCTZ 25 mg daily  Return 3 months for recheck and lab    Orders:  -     CBC Auto Differential; Future; Expected date: 06/17/2025  -     Comprehensive Metabolic Panel; Future; Expected date: 06/17/2025  -     Lipid Panel; Future; Expected date: 06/17/2025  -     TSH; Future; Expected date: 06/17/2025  -     Hemoglobin A1C; Future; Expected date: 06/17/2025  -     Urinalysis; Future; Expected date: 06/17/2025         No follow-ups on file. In addition to their scheduled follow up, the patient has also been instructed to follow up on as needed basis.     Future Appointments   Date Time Provider Department Center   3/26/2025  8:00 AM [facility]   4/1/2025  7:40 AM Adam Chan MD [facility] ORTHO [facility]   5/23/2025  8:30 AM Arielle Cisneros FNP [facility] ENT [facility]   6/17/2025  9:40 AM LAB, [facility] ANISHA Padgett MD

## 2025-03-18 ENCOUNTER — TELEPHONE (OUTPATIENT)
Dept: SURGERY | Facility: CLINIC | Age: 45
End: 2025-03-18
Payer: MEDICAID

## 2025-03-18 NOTE — TELEPHONE ENCOUNTER
Pt called stating that his ins denied omeprazole , let him knowto contact his pharmacy and have them fax me a PA for it to be approved. Reminder set to FU

## 2025-03-20 ENCOUNTER — TELEPHONE (OUTPATIENT)
Dept: SURGERY | Facility: CLINIC | Age: 45
End: 2025-03-20
Payer: MEDICAID

## 2025-03-20 NOTE — TELEPHONE ENCOUNTER
----- Message from Med Assistant Greta sent at 3/18/2025  8:38 AM CDT -----  Regarding: pa form prilosec  Pt called stating that his ins denied omeprazole , let him knowto contact his pharmacy and have them fax me a PA for it to be approved. Reminder set to FU

## 2025-03-20 NOTE — TELEPHONE ENCOUNTER
Pt stated as of 3.20.25 med still processing w/ ins. He stated he will call once he picks up meds and starts them . Reminder set to FU

## 2025-03-26 ENCOUNTER — CLINICAL SUPPORT (OUTPATIENT)
Dept: BARIATRICS | Facility: HOSPITAL | Age: 45
End: 2025-03-26

## 2025-03-26 VITALS — WEIGHT: 306.19 LBS | BODY MASS INDEX: 46.56 KG/M2

## 2025-03-26 DIAGNOSIS — E66.01 MORBID OBESITY WITH BMI OF 45.0-49.9, ADULT: Primary | ICD-10-CM

## 2025-03-26 PROCEDURE — 94200034 HC BARIATRIC NUTRITIONAL SVCS PT GRADE I

## 2025-03-26 NOTE — PROGRESS NOTES
"Patient Education [x] [facility] Visit Number: 6/6     []  Pre-op Wt Loss Goal (as ordered on referral):   [] [facility] Visit:     Height:   Ht Readings from Last 1 Encounters:   03/17/25 5' 8" (1.727 m)      Weight:   Wt Readings from Last 3 Encounters:   03/26/25 0807 (!) 138.9 kg (306 lb 3.2 oz)   03/17/25 1431 (!) 139.7 kg (308 lb)   02/25/25 1311 (!) 141.5 kg (312 lb)      BMI:   BMI Readings from Last 1 Encounters:   03/26/25 46.56 kg/m²                                                                        Barriers to learning:  [x]None evident  []Acuity of illness  []Cognitive defects  []Cultural barriers  []Desire/Motivation  []Difficulty concentrating  []Emotional state  []Financial concerns  []Hearing deficit  []Language barrier  []Literacy  []Memory problems  []Vision impairment     Home caregiver present for session   []YES  [x] NO     Teaching methods:  []Demonstration  [x]Explanation  []Printed materials  []Teach back  []Virtual/web based     Verbalizes understanding Demonstrates  Needs further teaching Needs practice/ supervision Comments    Bariatric Surgery Diet  [] [] [] []    Clear liquid [] [] [] []    Full liquid [] [] [] []    Weight Reduction [x] [] [] []    Other Diet [] [] [] []      Expected Compliance:  [x]Good  []Fair  []Poor    Additional Information:    Pt presents with a 2.5# weight loss from last month's visit. He has been trying out clear protein shakes, not skipping meals, reducing starches in diet, and prioritizing protein at each meal. Gave him modified pre-op diet to help measure serving sizes of foods like proteins, starches, and fruits to better prepare for surgery.     24 hr recall:  Breakfast: oath protein with water   Snack: turkey jerky- (2)  Lunch: smoked chicken with salad no rice   Dinner: small mashed potatoes portion with fish (baked)    Plan:  Continue having 3 meals daily and prioritizing protein at each meal  Continue drinking water only  Continue choosing protein based " snacks  Use modified pre-op diet to help portion meals to prepare for bariatric surgery

## 2025-04-08 ENCOUNTER — PATIENT MESSAGE (OUTPATIENT)
Dept: SURGERY | Facility: CLINIC | Age: 45
End: 2025-04-08
Payer: MEDICAID

## 2025-04-08 ENCOUNTER — TELEPHONE (OUTPATIENT)
Dept: SURGERY | Facility: CLINIC | Age: 45
End: 2025-04-08
Payer: MEDICAID

## 2025-04-08 NOTE — TELEPHONE ENCOUNTER
Procedure:  [x] VSG    [] RNY GBP   [] Revision   Insurance: [employer]   MD: [x] Dr. Campos     [] Dr. Bui     [] Dr. Arroyo   Assist:  [x] N/A    [] Dr. Campos     [] Dr. Bui     [] Dr. Arroyo   [] NP -     Surgery Date:  9/17/2025   Education Class: 8/19/2025   Pre Op Appt: 9/4/2025 at 9:15 a.m.   Facility:  [] [facility]  [] [facility]  [x] [facility]  Inpatient/ Outpatient:  [x] Inpatient  [] Outpatient      Confirmed the following with patient:  Smoking Hx: [] Yes   [x] No   Personal h/o blood clots/bleeding disorders:  [] Yes  [x] No  Family h/o blood clots/bleeding disorders: [] Yes  [x] No  Taking oral contraceptives/hormone meds/testosterone:  [] Yes  [x] No  Scheduled elective procedures/ travel in the next 30 days:  [] Yes  [x] No  GLP/Blood Thinners:  [] Yes  [x] No   Specialist: [x] Yes  [] No Cardiologist - Romina Loco     Please create case request.    Recall set to submit to insurance   Recall set to request cardiac clearance closer to surgery

## 2025-04-08 NOTE — TELEPHONE ENCOUNTER
EGD Date: 7/15/2025  MARILIN:   [x] With MARILIN       Facility:   [] [facility]    [] [facility] (2)    [x] [facility] MD:  [x] Dr. Campos   [] Dr. Bui   [] Dr. Arroyo     Please create case request.

## 2025-04-09 DIAGNOSIS — E66.01 MORBID OBESITY: Primary | ICD-10-CM

## 2025-04-09 DIAGNOSIS — A04.8 H. PYLORI INFECTION: Primary | ICD-10-CM

## 2025-04-15 ENCOUNTER — TELEPHONE (OUTPATIENT)
Dept: SURGERY | Facility: CLINIC | Age: 45
End: 2025-04-15
Payer: MEDICAID

## 2025-04-15 NOTE — TELEPHONE ENCOUNTER
----- Message from Med Assistant Greta sent at 4/4/2025 11:44 AM CDT -----  Regarding: RE: EGD  Spoke to pt on 4.4.25 stated he still had a few days to take meds. Reminder to call to FU again.  ----- Message -----  From: Greta Frank MA  Sent: 4/1/2025  12:00 AM CDT  To: Andres Valenzuela Staff  Subject: EGD                                              Fu on hypylori meds, if completed send message to blossom so she can order egd for pt.

## 2025-04-15 NOTE — TELEPHONE ENCOUNTER
called pt to check on meds if done . No answer ruthie freeman instructing to call clinic back reminder to shelia

## 2025-04-23 ENCOUNTER — CLINICAL SUPPORT (OUTPATIENT)
Dept: BARIATRICS | Facility: HOSPITAL | Age: 45
End: 2025-04-23

## 2025-04-23 VITALS — BODY MASS INDEX: 46.6 KG/M2 | WEIGHT: 306.5 LBS

## 2025-04-23 DIAGNOSIS — E66.01 MORBID OBESITY WITH BMI OF 45.0-49.9, ADULT: Primary | ICD-10-CM

## 2025-05-20 ENCOUNTER — OFFICE VISIT (OUTPATIENT)
Dept: ORTHOPEDICS | Facility: CLINIC | Age: 45
End: 2025-05-20
Payer: MEDICAID

## 2025-05-20 VITALS
HEART RATE: 86 BPM | DIASTOLIC BLOOD PRESSURE: 86 MMHG | BODY MASS INDEX: 46.44 KG/M2 | HEIGHT: 68 IN | WEIGHT: 306.44 LBS | SYSTOLIC BLOOD PRESSURE: 134 MMHG

## 2025-05-20 DIAGNOSIS — G56.03 CARPAL TUNNEL SYNDROME, BILATERAL: Primary | ICD-10-CM

## 2025-05-20 DIAGNOSIS — G56.23 CUBITAL TUNNEL SYNDROME OF BOTH UPPER EXTREMITIES: ICD-10-CM

## 2025-05-20 PROCEDURE — 76942 ECHO GUIDE FOR BIOPSY: CPT | Mod: PBBFAC | Performed by: SURGERY

## 2025-05-20 PROCEDURE — 99213 OFFICE O/P EST LOW 20 MIN: CPT | Mod: PBBFAC | Performed by: SURGERY

## 2025-05-20 RX ORDER — TRIAMCINOLONE ACETONIDE 40 MG/ML
40 INJECTION, SUSPENSION INTRA-ARTICULAR; INTRAMUSCULAR ONCE
Status: COMPLETED | OUTPATIENT
Start: 2025-05-20 | End: 2025-05-20

## 2025-05-20 RX ORDER — LIDOCAINE HYDROCHLORIDE 10 MG/ML
1 INJECTION, SOLUTION EPIDURAL; INFILTRATION; INTRACAUDAL; PERINEURAL
Status: COMPLETED | OUTPATIENT
Start: 2025-05-20 | End: 2025-05-20

## 2025-05-20 RX ADMIN — LIDOCAINE HYDROCHLORIDE 10 MG: 10 INJECTION, SOLUTION EPIDURAL; INFILTRATION; INTRACAUDAL; PERINEURAL at 03:05

## 2025-05-20 RX ADMIN — TRIAMCINOLONE ACETONIDE 40 MG: 40 INJECTION, SUSPENSION INTRA-ARTICULAR; INTRAMUSCULAR at 03:05

## 2025-05-20 NOTE — PROGRESS NOTES
"Subjective:    Patient ID: Edmund Roberts is a right handed 44 y.o. male  who presented to [facility] for follow up..      Chief Complaint: Pain of the Left Hand, Pain of the Right Hand, and Pain of the Left Elbow      History of Present Illness:    Edmund Roberts who has a history of bilateral carpel tunnel syndrome s/p carpel tunnel release surgery (2019 and 2020), and left cubital tunnel syndrome s/p ulnar nerve transposition surgery presented today for follow-up of Carpal tunnel syndrome and Cubital tunnel syndrome involving the bilateral upper extremity for the past few years.     He received right carpel tunnel CSI on 11/6/24 and left carpel tunnel CSI on 2/13/25. He had about 1 month of incomplete symptom relief in the right wrist, and a few months of incomplete relief in the left. I referred him to EMG/NCS of bilateral upper extremity and will review results with patient today.     He reports that he has L>R symptoms, mostly in the radial volar left hand, and some intermittent symptoms in the ulnar volar left hand radiating from his left medial elbow. He expressed concerns about receiving more than one injection per day, and would like to space out his injections as possible to one per visit.     Hand Review of Systems:  Swelling?  no  Instability?  no  Clicking?  no  Limited ROM? no  Fever/Chills? no  Subluxation? no  Dislocation? no  Numbness/Tingling? yes  Weakness? no       Objective:      Physical Exam:    /86   Pulse 86   Ht 5' 8" (1.727 m)   Wt (!) 139 kg (306 lb 7 oz)   BMI 46.59 kg/m²     Ortho/SPM Exam    Appearance:  Soft tissue swelling: Left: no Right: no  Effusion: Left:  Negative Right: Negative  Erythema: Left no Right: no  Ecchymosis: Left: no Right: no  Atrophy: Left: no Right: no    Palpation:  Hand/wrist Tenderness: Left: none  Right: none    Range of motion:  Flexion (0-80): Left:  80 Right: 80  Extension (0-70): Left:  70 " Right: 70  Ulnar deviation (0-30): 30 Right: 30  Radial deviation (0-20): 20 Right: 20  Supination (0-90): Left: 90 Right: 90  Pronation (0-90): Left: 90 Right: 90  Able to make a power fist and claw hand: on Both hand(s)  Distal palmar crease-finger tip distance: 0 on Both hand(s)    Strength:  Abductor Pollicis Brevis: Left: 4/5 Right: 5/5   Finger abduction: Left: 4/5 Right 5/5   Flexion: Left: 5/5 Pain: no Right: 5/5 Pain: no  Extension: Left: 5/5 Pain: no Right: 5/5 Pain: no  Supination: Left: 5/5 Pain: no Right: 5/5 Pain: no  Pronation: Left: 5/5 Pain: no Right: 5/5 Pain: no  Ulnar deviation: Left: 5/5 Pain: no Right: 5/5 Pain: no  Radial deviation: Left: 5/5 Pain: no Right: 5/5 Pain: no    Special Tests:  Durkans Test (Carpal Compression test): Left: Positive  Right: Positive  Tinels (Carpel Tunnel):  Left: Positive  Right: Negative    Tinels (Cubital Tunnel):  Left: Positive  Right: Negative    Phalens: Left: Positive  Right: Positive    Kody Litler test:  Left: Negative  Right: Negative    UCL laxity: Left: Not performed  Right: Not performed  FDP test: Left: Negative  Right: Negative  FDS test: Left: Negative  Right: Negative  Reverse Phalens: Left: Not performed Right: Not performed  Finkelstein's Test: Left: Negative Right: Negative    Froments: Left: Not performed Right: Not performed  Shuck Test: Left: Not performed Right: Not performed    AIN/PIN/Ulnar nerve: Intact and symmetric    Neurovascular Exam  General appearance: NAD  Peripheral pulses: normal bilaterally   Reflexes: Left: Not performed Right: Not performed   Sensation: diminished (left volar radial 1-3 fingers)    Labs:  Last A1c: 6.3     Imaging:   Previous images reviewed.  X-rays ordered and performed today: no    EMG/NCS bilateral upper extremity 4/8/25  - Mild to moderate bilateral ulnar neuropathy at the elbow  - Moderate to severe bilateral median neuropathy at the wrist     Assessment:        Encounter Diagnoses   Code Name  Primary?    G56.03 Carpal tunnel syndrome, bilateral Yes    G56.23 Cubital tunnel syndrome of both upper extremities         Plan:           Orders Placed This Encounter   Procedures    Carpal Tunnel: L carpal tunnel     This order was created via procedure documentation    ULS US Guidance for Needle Placement     Release to patient:   Immediate     Medications Ordered This Encounter   Medications    LIDOcaine (PF) 10 mg/ml (1%) injection 10 mg    triamcinolone acetonide injection 40 mg       MDM: Prior external referring provider notes reviewed. Prior external referring provider studies reviewed.   Patient has bilateral carpel tunnel syndrome (moderate to severe) and bilateral cubital tunnel syndrome (mild to moderate).  He has a history of bilateral carpel tunnel syndrome s/p carpel tunnel release surgery (2019 and 2020), and left cubital tunnel syndrome s/p ulnar nerve transposition surgery.  His symptoms are most severe in the left carpel tunnel today, and since patient expressed that he only wants one injection per visit, will perform left carpel tunnel CSI with median nerve hydodissection at the left wrist today.  Dx: left. Carpal tunnel syndrome , acute moderate exacerbation; left cubital tunnel syndrome  Treatment Plan: Discussed with patient diagnosis and treatment recommendations.   Natural history and expected course discussed. Questions answered.  Educational material distributed.  Reduction in offending activity.  Gentle ROM exercises.  Rest, ice, compression, and elevation (RICE) therapy.  Plain film x-rays.  Home physical therapy exercise handouts provided to patient.   Over the counter NSAID and/or tylenol provided you do not have contraindications such as but not limited to liver or kidney disease or uncontrolled blood pressure. If you're doctors have told you to to not take them based on your health, do not take them.   Imaging: prior radiological studies independently reviewed; discussed with  patient; agree with radiologist interpretation.   Procedure: Discussed CSI/VSI as treatment options; discussed injections as treatment options; since conservative measures did not improve symptoms patient consented for CSI today.  Activity: Activity as tolerated  Therapy: No formal therapy  Medication: START over-the-counter acetaminophen (Tylenol 1000 mg three times per day as needed)  CONTINUE previously prescribed medication meloxicam OR voltaren (not both at the same time). Please see your primary care physician for further refills.  RTC: 4 weeks, left cubital tunnel CSI / ulnar nerve hydrodissection.         Carpal Tunnel: L carpal tunnel    Date/Time: 5/20/2025 12:00 PM    Performed by: Adam Chan MD  Authorized by: Adam Chan MD    Consent Done?:  Yes (Written)  Indications:  Pain  Site marked: the procedure site was marked    Timeout: prior to procedure the correct patient, procedure, and site was verified    Local anesthesia used?: Yes    Local anesthetic:  Topical anesthetic  Location:  Wrist  Site:  L carpal tunnel  Ultrasonic Guidance for Needle Placement?: Yes    Needle size:  27 G  Approach: Radial.  Patient tolerance:  Patient tolerated the procedure well with no immediate complications     Additional Comments: Staff Attending: Adam Chan MD    Risks:  Possible complications with the injection include bleeding, infection (.01%), tendon rupture, steroid flare, fat pad or soft tissue atrophy, skin depigmentation, allergic reaction to medications and vasovagal response. (steroid flare treatment is rest, ice, NSAIDs and resolves in 24-36 hours.)    Consent:  No absolute contraindications (cellulitis overlying joint, infection, lack of informed consent, allergy to injection medication, AVN protein or egg allergy for sodium hyaluronate, or history of steroid flare) or relative contraindications (uncontrolled DM2 A1c>10, coagulopathy, INR > 3.5, previous joint replacement or history of AVN).         Description:  The patient was prepped in normal sterile fashion use of chlorhexidine scrub and the appropriate and anatomic landmarks were identified with ultrasound as image guidance. The patient was evaluated with a [employer] ultrasound machine using a 10 MHz linear probe, following a standard protocol. Ultrasound imaging confirmed placement of the needle in the correct position, with reference to surrounding anatomic structures. Dynamic visualization of the needle was continuous throughout the procedure(s) and maintained good position. Care was taken to ensure there was unrestricted flow of syringe contents (listed below) into the site of injection. The ultrasound image for needle placement was captured and saved in the patient's medical record.        Indication for use of Ultrasound Guidance: The indication for the use of ultrasound was to ensure accurate localization of needle for aspiration and/or injection, and minimize risk of damage to surrounding structures. Geri EL, Arie S, Db LJ, Sanjeev BURROWS. Improving injection accuracy of the elbow, knee, and shoulder: does injection site and imaging make a difference? A systematic review. Am J Sports Med. 2011 Mar;39(3):656-62. doi: 10.1177/0785460605990413. Epub 2011 Jan 21. PMID: 96675328.    BMI 46.59 kg/m2    Contents of syringe included: 1mL of 1% lidocaine with 40mg of Kenalog (1mL of 40mg/mL)    Post Procedure: Patient alert, and moving all extremities. ROM improved, pain decreased.  Good peripheral pulses, no signs of vascular compromise and range of motion intact.  Aftercare instructions were given to patient at time of discharge.  Relative rest for 3 days-avoiding excess activity.  Place ice on the area for 15 minutes every 4-6 hours. Patient may take Tylenol a 1000 mg b.i.d. or ibuprofen 600 mg t.i.d. for the next 3-4 days if not on medication already and safe to take pending co-morbidities.  Protect the area for the next 1-8 hours if anesthetic was  used.  Avoid excessive activity for the next 3-4 weeks.  ER precautions given for fever, severe joint pain or allergic reaction or other new symptoms related to the joint injection.            Adam Chan MD  Sports Medicine

## 2025-05-21 ENCOUNTER — CLINICAL SUPPORT (OUTPATIENT)
Dept: BARIATRICS | Facility: HOSPITAL | Age: 45
End: 2025-05-21

## 2025-05-21 VITALS — WEIGHT: 308.63 LBS | BODY MASS INDEX: 46.92 KG/M2

## 2025-05-21 DIAGNOSIS — E66.01 MORBID OBESITY WITH BMI OF 45.0-49.9, ADULT: Primary | ICD-10-CM

## 2025-05-21 NOTE — PROGRESS NOTES
"[facility]  Otolaryngology Clinic Note    Edmund Roberts  YOB: 1980    Chief Complaint:   Chief Complaint   Patient presents with    referral: Otalgia of Right Ear          HPI: 05/23/2025: 44 y.o. male referred for otalgia. States he is here today for ongoing sinus and ear issues. He has a history of two sinus surgeries performed by Dr. Hendricks, most recenlty 3-4 years ago. He experiences ongoing nasal buildup requiring frequent cleaning, persistent dry throat despite CPAP temperature adjustments and humidifier use, and sneezing. He reports his hearing often becomes "thin" and experiences ear pain. While he can pop his ears open, they do not remain open for long periods. He does not use sinus rinses. He uses flonase infrequently when symptoms are severe. He thinks allergy testing many years ago was essentially negative. He has a history of acid reflux for which he takes protonix prn. He uses prescribed nasal spray when symptoms are severe.    ROS:   10-point review of systems negative except per HPI      Review of patient's allergies indicates:  No Known Allergies    Past Medical History:   Diagnosis Date    Arthritis     B12 deficiency     Cholesterol depletion     Elevated serum creatinine     Gastritis     GERD (gastroesophageal reflux disease)     Hypertension     Hyperuricemia     IBS (irritable bowel syndrome)     Knee pain     Mixed hyperlipidemia     Obesity, unspecified     ALICE (obstructive sleep apnea)     Plantar fasciitis, bilateral     Pre-diabetes     Prostatitis, chronic     Sleep apnea     Vitamin D3 deficiency        Past Surgical History:   Procedure Laterality Date    BACK SURGERY  2006    BACK SURGERY  2013    CARPAL TUNNEL RELEASE  2019    CHOLECYSTECTOMY  06/2018    CYST REMOVAL Left 2005    SINUS SURGERY      SPINE SURGERY      TONSILLECTOMY  03/2017    TRIGGER FINGER RELEASE  11/2019    VASECTOMY      vastectomy         Social History[1]    Family " History   Problem Relation Name Age of Onset    Hypertension Mother      Drug abuse Father      Hypertension Sister      Cancer Maternal Grandfather          lung       Encounter Medications[2]    Physical Exam:  Vitals:    05/23/25 0848   BP: 122/84   BP Location: Right arm   Patient Position: Sitting   Pulse: 80   Temp: 98.1 °F (36.7 °C)   TempSrc: Oral       Physical Exam   General: NAD, voice normal  Neuro: AAO, CN II - XII grossly intact  Head/ Face: NCAT, symmetric, sensations intact bilaterally  Eyes: EOMI, PERRL  Ears: externally normal with grossly normal hearing  AD: EAC patent, TM intact, no middle ear effusion, no retractions  AS: EAC patent, TM intact, no middle ear effusion, no retractions  Nose: bilateral nares patent, midline septum, no rhinorrhea, no external deformity, no turbinate hypertrophy. Mucosa is dry with crusting.  OC/OP: MMM, no intraoral lesions, no trismus, dentition is good, no uvular deviation, bilaterally symmetric soft palate elevation, palatoglossus and palatopharyngeal fold wnl; tonsils are symmetric/absent. Mallampati IV with strong gag  Indirect laryngoscopy: deferred due to patient intolerance  Neck: soft, supple, no LAD, normal ROM, no thyromegaly  Respiratory: nonlabored, no wheezing, bilateral chest rise  Cardiovascular: RRR  Gastrointestinal: S NT ND  Skin: warm, no lesions  Musculoskeletal: 5/5 strength  Psych: Appropriate affect/mood     Pertinent Data:  ? LABS:  ? AUDIO:           ? PATH:      Imaging:   I personally reviewed the following images:        Assessment/Plan:  44 y.o. male with AR, CRS, ETD. Hx of ESS x 2. Not medically optimized.   - NSI Daily  - Flonase BID (reviewed technique)  - Saline gel prn  - Zyrtec daily  - Audio  - RTC 3mo    Arielle Cisneros NP             [1]   Social History  Socioeconomic History    Marital status: Single    Number of children: 5   Tobacco Use    Smoking status: Never    Smokeless tobacco: Never   Substance and Sexual Activity     Alcohol use: No    Drug use: Not Currently     Comment: medical Marijuana    Sexual activity: Not Currently     Partners: Female     Birth control/protection: Abstinence     Social Drivers of Health     Financial Resource Strain: Medium Risk (2/25/2025)    Overall Financial Resource Strain (CARDIA)     Difficulty of Paying Living Expenses: Somewhat hard   Food Insecurity: No Food Insecurity (2/25/2025)    Hunger Vital Sign     Worried About Running Out of Food in the Last Year: Never true     Ran Out of Food in the Last Year: Never true   Transportation Needs: No Transportation Needs (2/25/2025)    PRAPARE - Transportation     Lack of Transportation (Medical): No     Lack of Transportation (Non-Medical): No   Physical Activity: Inactive (2/25/2025)    Exercise Vital Sign     Days of Exercise per Week: 0 days     Minutes of Exercise per Session: 0 min   Stress: No Stress Concern Present (2/25/2025)    [employer] - Occupational Stress Questionnaire     Feeling of Stress : Not at all   Housing Stability: Low Risk  (2/25/2025)    Housing Stability Vital Sign     Unable to Pay for Housing in the Last Year: No     Homeless in the Last Year: No   [2]   Outpatient Encounter Medications as of 5/23/2025   Medication Sig Dispense Refill    atorvastatin (LIPITOR) 40 MG tablet Take 40 mg by mouth once daily.      hydroCHLOROthiazide (HYDRODIURIL) 50 MG tablet Take 1 tablet (50 mg total) by mouth once daily. (Patient taking differently: Take 12.5 mg by mouth once daily.) 30 tablet 2    irbesartan (AVAPRO) 300 MG tablet Take 300 mg by mouth once daily.      ketoconazole (NIZORAL) 2 % cream Apply 1 Application topically as needed.      metFORMIN (GLUCOPHAGE-XR) 500 MG ER 24hr tablet Take 1 tablet (500 mg total) by mouth daily with breakfast. 90 tablet 3    pantoprazole (PROTONIX) 40 MG tablet Take 40 mg by mouth once daily.      tamsulosin (FLOMAX) 0.4 mg Cap Take 0.4 mg by mouth.      cetirizine  (ZYRTEC) 10 MG tablet Take 1 tablet (10 mg total) by mouth once daily. 30 tablet 11    ciprofloxacin-dexAMETHasone 0.3-0.1% (CIPRODEX) 0.3-0.1 % DrpS 4 drops. (Patient not taking: Reported on 5/23/2025)      diclofenac (VOLTAREN) 75 MG EC tablet Take 75 mg by mouth 2 (two) times daily. (Patient not taking: Reported on 5/23/2025)      meloxicam (MOBIC) 15 MG tablet Take 15 mg by mouth. (Patient not taking: Reported on 5/23/2025)      omeprazole (PRILOSEC) 20 MG capsule Take 1 capsule (20 mg total) by mouth 2 (two) times a day. for 14 days (Patient not taking: Reported on 5/23/2025) 28 capsule 0     No facility-administered encounter medications on file as of 5/23/2025.

## 2025-05-22 ENCOUNTER — TELEPHONE (OUTPATIENT)
Dept: SURGERY | Facility: CLINIC | Age: 45
End: 2025-05-22
Payer: MEDICAID

## 2025-05-22 ENCOUNTER — PATIENT MESSAGE (OUTPATIENT)
Dept: SURGERY | Facility: CLINIC | Age: 45
End: 2025-05-22
Payer: MEDICAID

## 2025-05-22 NOTE — TELEPHONE ENCOUNTER
Due to scheduling conflicts, rescheduled EGD from [place] [facility] 7/15/25 to now [facility] 7/23/25.   Pt and facility notified.

## 2025-05-23 ENCOUNTER — OFFICE VISIT (OUTPATIENT)
Dept: OTOLARYNGOLOGY | Facility: CLINIC | Age: 45
End: 2025-05-23
Payer: MEDICAID

## 2025-05-23 VITALS — DIASTOLIC BLOOD PRESSURE: 84 MMHG | HEART RATE: 80 BPM | TEMPERATURE: 98 F | SYSTOLIC BLOOD PRESSURE: 122 MMHG

## 2025-05-23 DIAGNOSIS — J32.9 CHRONIC SINUSITIS, UNSPECIFIED LOCATION: ICD-10-CM

## 2025-05-23 DIAGNOSIS — H91.90 HEARING DISORDER, UNSPECIFIED LATERALITY: ICD-10-CM

## 2025-05-23 DIAGNOSIS — H92.01 OTALGIA OF RIGHT EAR: ICD-10-CM

## 2025-05-23 DIAGNOSIS — J34.89 DRY NOSE: ICD-10-CM

## 2025-05-23 DIAGNOSIS — H69.90 DYSFUNCTION OF EUSTACHIAN TUBE, UNSPECIFIED LATERALITY: ICD-10-CM

## 2025-05-23 DIAGNOSIS — J30.9 ALLERGIC RHINITIS, UNSPECIFIED SEASONALITY, UNSPECIFIED TRIGGER: Primary | ICD-10-CM

## 2025-05-23 PROCEDURE — 99213 OFFICE O/P EST LOW 20 MIN: CPT | Mod: PBBFAC | Performed by: NURSE PRACTITIONER

## 2025-05-23 RX ORDER — CETIRIZINE HYDROCHLORIDE 10 MG/1
10 TABLET ORAL DAILY
Qty: 30 TABLET | Refills: 11 | Status: SHIPPED | OUTPATIENT
Start: 2025-05-23 | End: 2025-06-22

## 2025-05-23 RX ORDER — FLUTICASONE PROPIONATE 50 MCG
2 SPRAY, SUSPENSION (ML) NASAL 2 TIMES DAILY
Qty: 16 G | Refills: 11 | Status: SHIPPED | OUTPATIENT
Start: 2025-05-23 | End: 2025-06-22

## 2025-06-26 ENCOUNTER — CLINICAL SUPPORT (OUTPATIENT)
Dept: BARIATRICS | Facility: HOSPITAL | Age: 45
End: 2025-06-26

## 2025-06-26 VITALS — BODY MASS INDEX: 46.65 KG/M2 | WEIGHT: 306.81 LBS

## 2025-06-26 DIAGNOSIS — E66.01 MORBID OBESITY WITH BMI OF 45.0-49.9, ADULT: Primary | ICD-10-CM

## 2025-07-01 ENCOUNTER — OFFICE VISIT (OUTPATIENT)
Dept: ORTHOPEDICS | Facility: CLINIC | Age: 45
End: 2025-07-01
Payer: MEDICAID

## 2025-07-01 VITALS
HEART RATE: 80 BPM | SYSTOLIC BLOOD PRESSURE: 135 MMHG | DIASTOLIC BLOOD PRESSURE: 90 MMHG | BODY MASS INDEX: 46.44 KG/M2 | HEIGHT: 68 IN | WEIGHT: 306.44 LBS

## 2025-07-01 DIAGNOSIS — G57.12 MERALGIA PARESTHETICA OF LEFT SIDE: ICD-10-CM

## 2025-07-01 DIAGNOSIS — G56.03 CARPAL TUNNEL SYNDROME, BILATERAL: Primary | ICD-10-CM

## 2025-07-01 PROCEDURE — 99213 OFFICE O/P EST LOW 20 MIN: CPT | Mod: PBBFAC | Performed by: SURGERY

## 2025-07-01 PROCEDURE — 20526 THER INJECTION CARP TUNNEL: CPT | Mod: PBBFAC,RT | Performed by: SURGERY

## 2025-07-01 RX ORDER — DEXAMETHASONE SODIUM PHOSPHATE 4 MG/ML
4 INJECTION, SOLUTION INTRA-ARTICULAR; INTRALESIONAL; INTRAMUSCULAR; INTRAVENOUS; SOFT TISSUE
Status: COMPLETED | OUTPATIENT
Start: 2025-07-01 | End: 2025-07-01

## 2025-07-01 RX ORDER — PREDNISONE 10 MG/1
TABLET ORAL
Qty: 15 TABLET | Refills: 0 | Status: SHIPPED | OUTPATIENT
Start: 2025-07-01 | End: 2025-07-11

## 2025-07-01 RX ORDER — LIDOCAINE HYDROCHLORIDE 10 MG/ML
1 INJECTION, SOLUTION EPIDURAL; INFILTRATION; INTRACAUDAL; PERINEURAL
Status: COMPLETED | OUTPATIENT
Start: 2025-07-01 | End: 2025-07-01

## 2025-07-01 RX ADMIN — LIDOCAINE HYDROCHLORIDE 10 MG: 10 INJECTION, SOLUTION EPIDURAL; INFILTRATION; INTRACAUDAL; PERINEURAL at 03:07

## 2025-07-01 RX ADMIN — DEXAMETHASONE SODIUM PHOSPHATE 4 MG: 4 INJECTION, SOLUTION INTRA-ARTICULAR; INTRALESIONAL; INTRAMUSCULAR; INTRAVENOUS; SOFT TISSUE at 03:07

## 2025-07-01 NOTE — PROGRESS NOTES
"Subjective:    Patient ID: Edmund Roberts is a right handed 44 y.o. male  who presented to [facility] for follow up..      Chief Complaint: Pain of the Left Wrist and Pain of the Left Elbow      History of Present Illness:    Edmund Roberts who has a history of bilateral carpel tunnel syndrome s/p surgical release presented today for follow-up of bilateral carpal tunnel syndrome.    He was last seen in this clinic on 5/20/25 and received left carpel tunnel CSI. He reports good relief in his left hand today, but he is having a flare in his right hand/wrist, especially if he flexes his right wrist. There is numbness / tingling at all times in his right volar 1st-4th fingers, worse with wrist flexion. Denies any new injuries / trauma.    Of note, he is also reporting new onset left anterolateral thigh numbness / burning for the past 3-4 weeks. Denies any injuries / trauma. There is also associated lateral hip pain with snapping.    Hand Review of Systems:  Swelling?  no  Instability?  no  Clicking?  no  Limited ROM? no  Fever/Chills? no  Subluxation? no  Dislocation? no  Numbness/Tingling? yes  Weakness? yes       Objective:      Physical Exam:    BP (!) 135/90   Pulse 80   Ht 5' 8" (1.727 m)   Wt (!) 139 kg (306 lb 7 oz)   BMI 46.59 kg/m²     Ortho/SPM Exam    Appearance:  Soft tissue swelling: Left: no Right: no  Effusion: Left:  Negative Right: Negative  Erythema: Left no Right: no  Ecchymosis: Left: no Right: no  Atrophy: Left: no Right: no    Palpation:  Hand/wrist Tenderness: Left: volar wrist  Right: none    Range of motion:  Flexion (0-80): Left:  80 Right: 80  Extension (0-70): Left:  70 Right: 70  Ulnar deviation (0-30): 30 Right: 30  Radial deviation (0-20): 20 Right: 20  Supination (0-90): Left: 90 Right: 90  Pronation (0-90): Left: 90 Right: 90  Able to make a power fist and claw hand: on Both hand(s)  Distal palmar crease-finger tip distance: 0 " on Both hand(s)    Strength:  Abductor Pollicis Brevis: Left: 5/5 Right: 4/5   Finger abduction: Left: 5/5 Right 5/5   Flexion: Left: 5/5 Pain: no Right: 5/5 Pain: no  Extension: Left: 5/5 Pain: no Right: 5/5 Pain: no  Supination: Left: 5/5 Pain: no Right: 5/5 Pain: no  Pronation: Left: 5/5 Pain: no Right: 5/5 Pain: no  Ulnar deviation: Left: 5/5 Pain: no Right: 5/5 Pain: no  Radial deviation: Left: 5/5 Pain: no Right: 5/5 Pain: no    Special Tests:  Durkans Test (Carpal Compression test): Left: Negative  Right: Positive  Tinels:  Left: Positive  Right: Positive    Phalens: Left: Negative  Right: Positive      Neurovascular Exam  General appearance: NAD  Peripheral pulses: normal bilaterally   Reflexes: Left: Not performed Right: Not performed   Sensation: Diminished in right volar hand / wrist.    Labs:  Last A1c: 6.3     Imaging:   Previous images reviewed.  X-rays ordered and performed today: no     EMG/NCS bilateral upper extremity 4/8/25  - Mild to moderate bilateral ulnar neuropathy at the elbow  - Moderate to severe bilateral median neuropathy at the wrist    Assessment:        Encounter Diagnoses   Code Name Primary?    G56.03 Carpal tunnel syndrome, bilateral Yes    G57.12 Meralgia paresthetica of left side         Plan:           Orders Placed This Encounter   Procedures    Carpal Tunnel     This order was created via procedure documentation    ULS US Guidance for Needle Placement     Release to patient:   Immediate     Medications Ordered This Encounter   Medications    dexAMETHasone injection 4 mg    LIDOcaine (PF) 10 mg/ml (1%) injection 10 mg    predniSONE (DELTASONE) 10 MG tablet     Sig: Take 2 tablets (20 mg total) by mouth once daily for 5 days, THEN 1 tablet (10 mg total) once daily for 5 days.     Dispense:  15 tablet     Refill:  0       MDM: Prior external referring provider notes reviewed. Prior external referring provider studies reviewed.   His left carpel tunnel syndrome is currently well  controlled, while his right carpal tunnel is flaring up. Will perform right carpel tunnel / median nerve hydrodissection today.  He is presenting with new symptoms of anterolateral thigh burning / numbness consistent with left hip meralgia paresthetica.  Dx: right. Carpal tunnel syndrome  acute moderate exacerbation  Treatment Plan: Discussed with patient diagnosis and treatment recommendations.   Natural history and expected course discussed. Questions answered.  Educational material distributed.  Reduction in offending activity.  Gentle ROM exercises.  Rest, ice, compression, and elevation (RICE) therapy.  Plain film x-rays.  Home physical therapy exercise handouts provided to patient.   Over the counter NSAID and/or tylenol provided you do not have contraindications such as but not limited to liver or kidney disease or uncontrolled blood pressure. If you're doctors have told you to to not take them based on your health, do not take them.   Imaging: prior radiological studies independently reviewed; discussed with patient; agree with radiologist interpretation.   Procedure: Discussed CSI/VSI as treatment options; discussed injections as treatment options; since conservative measures did not improve symptoms patient consented for CSI today.  Activity: Activity as tolerated  Therapy: Occupational Therapy  Medication: START prednisone taper as directed, do not take NSAIDs while taking prednisone (discussed with patient). Please see your primary care physician for further refills.  RTC: 1 month for left anterolateral hip pain and bilateral hand/wrist numbness.         Carpal Tunnel: R carpal tunnel    Date/Time: 7/1/2025 2:00 PM    Performed by: Adam Chan MD  Authorized by: Adam Chan MD    Consent Done?:  Yes (Written)  Indications:  Pain  Site marked: the procedure site was marked    Timeout: prior to procedure the correct patient, procedure, and site was verified    Local anesthesia used?: Yes    Local  anesthetic:  Topical anesthetic  Location:  Wrist  Site:  R carpal tunnel  Ultrasonic Guidance for Needle Placement?: Yes    Needle size:  27 G  Approach:  Volar (Ulnar)  Patient tolerance:  Patient tolerated the procedure well with no immediate complications     Additional Comments: Staff Attending: Adam Chan MD    Risks:  Possible complications with the injection include bleeding, infection (.01%), tendon rupture, steroid flare, fat pad or soft tissue atrophy, skin depigmentation, allergic reaction to medications and vasovagal response. (steroid flare treatment is rest, ice, NSAIDs and resolves in 24-36 hours.)    Consent:  No absolute contraindications (cellulitis overlying joint, infection, lack of informed consent, allergy to injection medication, AVN protein or egg allergy for sodium hyaluronate, or history of steroid flare) or relative contraindications (uncontrolled DM2 A1c>10, coagulopathy, INR > 3.5, previous joint replacement or history of AVN).        Description:  The patient was prepped in normal sterile fashion use of chlorhexidine scrub and the appropriate and anatomic landmarks were identified with ultrasound as image guidance. The patient was evaluated with a Jumio ultrasound machine using a 10 MHz linear probe, following a standard protocol. Ultrasound imaging confirmed placement of the needle in the correct position, with reference to surrounding anatomic structures. Dynamic visualization of the needle was continuous throughout the procedure(s) and maintained good position. Care was taken to ensure there was unrestricted flow of syringe contents (listed below) into the site of injection. The ultrasound image for needle placement was captured and saved in the patient's medical record.        Indication for use of Ultrasound Guidance: The indication for the use of ultrasound was to ensure accurate localization of needle for aspiration and/or injection, and minimize risk of damage to  surrounding structures. Geri EL, Arie S, Db LJ, Sanjeev BJ. Improving injection accuracy of the elbow, knee, and shoulder: does injection site and imaging make a difference? A systematic review. Am J Sports Med. 2011 Mar;39(3):656-62. doi: 10.1177/9037190768630512. Epub 2011 Jan 21. PMID: 73905530.    @BMI@     Contents of syringe included: 1mL of 1% lidocaine with 4mg of Decadron/dexamethasone (1mL of 4mg/mL)    Post Procedure: Patient alert, and moving all extremities. ROM improved, pain decreased.  Good peripheral pulses, no signs of vascular compromise and range of motion intact.  Aftercare instructions were given to patient at time of discharge.  Relative rest for 3 days-avoiding excess activity.  Place ice on the area for 15 minutes every 4-6 hours. Patient may take Tylenol a 1000 mg b.i.d. or ibuprofen 600 mg t.i.d. for the next 3-4 days if not on medication already and safe to take pending co-morbidities.  Protect the area for the next 1-8 hours if anesthetic was used.  Avoid excessive activity for the next 3-4 weeks.  ER precautions given for fever, severe joint pain or allergic reaction or other new symptoms related to the joint injection.            Adam Chan MD  Sports Medicine

## 2025-07-15 RX ORDER — PREDNISONE 10 MG/1
10 TABLET ORAL DAILY
COMMUNITY

## 2025-07-16 ENCOUNTER — TELEPHONE (OUTPATIENT)
Dept: SURGERY | Facility: CLINIC | Age: 45
End: 2025-07-16
Payer: MEDICAID

## 2025-07-16 NOTE — TELEPHONE ENCOUNTER
Pt called concerned about his next weigh in. He was recently on steroids and had a steroid shot and it caused him to gain almost 15 pounds in a week. Pt is no longer on the meds. But weight went up to 320#. He said he's been continuing with his diet but is nervous coming and it showing a weight gain. He is on a fluid pill. Advised I would let everyone know but to continue dieting. Depending on the weight gain he may need an appt with the dietician. He agreed to plan.   
Abdomen soft, non-tender, no guarding.

## 2025-07-22 PROBLEM — E66.01 MORBID OBESITY WITH BMI OF 45.0-49.9, ADULT: Status: ACTIVE | Noted: 2025-07-22

## 2025-07-22 NOTE — H&P
[facility] Surgical - Periop Services 2nd Floor  [address]  [place] [address]  Phone: [phone]    General and Bariatric Surgery  History & Physical  Dr. Nicolas Campos    Patient Name: Edmund Roberts  MRN: [id number]  Admission Date: (Not on file)  Attending Physician: Nicolas Campos MD   Primary Care Provider: Omega Talavera Sr., MD (Inactive)    Patient information was obtained from patient and past medical records.    Subjective:     Chief Complaint/Reason for Admission: EGD      History of Present Illness:  Patient is a 44 y.o. male presents to ambulatory surgery facility for elective EGD.   Patient is currently undergoing workup for Vertical Sleeve Gastrectomy. Surgery tentatively scheduled for surgery 9/17/25.  H Pylori positive 3/14/25. Treated with eradication antibiotics.   No complaints at present.   History of GERD. On daily PPI.     No current facility-administered medications on file prior to encounter.     Current Outpatient Medications on File Prior to Encounter   Medication Sig    atorvastatin (LIPITOR) 40 MG tablet Take 40 mg by mouth once daily.    irbesartan (AVAPRO) 300 MG tablet Take 300 mg by mouth once daily.    ketoconazole (NIZORAL) 2 % cream Apply 1 Application topically as needed.    metFORMIN (GLUCOPHAGE-XR) 500 MG ER 24hr tablet Take 1 tablet (500 mg total) by mouth daily with breakfast. (Patient taking differently: Take 500 mg by mouth once daily.)    pantoprazole (PROTONIX) 40 MG tablet Take 40 mg by mouth once daily.    tamsulosin (FLOMAX) 0.4 mg Cap Take 0.4 mg by mouth.    ciprofloxacin-dexAMETHasone 0.3-0.1% (CIPRODEX) 0.3-0.1 % DrpS 4 drops. (Patient not taking: Reported on 5/23/2025)    diclofenac (VOLTAREN) 75 MG EC tablet Take 75 mg by mouth 2 (two) times daily. (Patient not taking: Reported on 5/23/2025)    hydroCHLOROthiazide (HYDRODIURIL) 50 MG tablet Take 1 tablet (50 mg total) by mouth once daily. (Patient taking differently: Take  12.5 mg by mouth once daily.)    meloxicam (MOBIC) 15 MG tablet Take 15 mg by mouth. (Patient not taking: Reported on 5/23/2025)    omeprazole (PRILOSEC) 20 MG capsule Take 1 capsule (20 mg total) by mouth 2 (two) times a day. for 14 days (Patient not taking: Reported on 5/23/2025)    predniSONE (DELTASONE) 10 MG tablet Take 10 mg by mouth once daily.       Review of patient's allergies indicates:  No Known Allergies    Past Medical History:   Diagnosis Date    Arthritis     B12 deficiency     Cholesterol depletion     Elevated serum creatinine     Gastritis     GERD (gastroesophageal reflux disease)     Hip pain     Left    Hypertension     Hyperuricemia     IBS (irritable bowel syndrome)     Knee pain     Mixed hyperlipidemia     Obesity, unspecified     ALICE (obstructive sleep apnea)     uses cpap    Plantar fasciitis, bilateral     Pre-diabetes     Prostatitis, chronic     Sleep apnea     Vitamin D3 deficiency      Past Surgical History:   Procedure Laterality Date    BACK SURGERY  2006    BACK SURGERY  2013    CARPAL TUNNEL RELEASE  2019    CHOLECYSTECTOMY  06/2018    CYST REMOVAL Left 2005    SINUS SURGERY      SPINE SURGERY      TONSILLECTOMY  03/2017    TRIGGER FINGER RELEASE  11/2019    ULNAR TUNNEL RELEASE Left     VASECTOMY       Family History       Problem Relation (Age of Onset)    Cancer Maternal Grandfather    Drug abuse Father    Hypertension Mother, Sister          Tobacco Use    Smoking status: Never    Smokeless tobacco: Never   Vaping Use    Vaping status: Never Used   Substance and Sexual Activity    Alcohol use: No    Drug use: Yes     Comment: medical Marijuana prn    Sexual activity: Not Currently     Partners: Female     Birth control/protection: Abstinence     Review of Systems   Constitutional: Negative.    HENT: Negative.     Eyes: Negative.    Respiratory: Negative.     Cardiovascular: Negative.    Gastrointestinal: Negative.    Endocrine: Negative.    Genitourinary: Negative.     Musculoskeletal: Negative.    Skin: Negative.    Allergic/Immunologic: Negative.    Neurological: Negative.    Psychiatric/Behavioral: Negative.     All other systems reviewed and are negative.      Objective:     Vital Signs (Most Recent):    Vital Signs (24h Range):        Weight: (!) 138.8 kg (306 lb)  Body mass index is 46.53 kg/m².    Physical Exam  Vitals reviewed.   Constitutional:       General: He is not in acute distress.     Appearance: Normal appearance. He is obese.   HENT:      Head: Normocephalic.   Eyes:      Conjunctiva/sclera: Conjunctivae normal.      Pupils: Pupils are equal, round, and reactive to light.   Cardiovascular:      Rate and Rhythm: Normal rate and regular rhythm.      Pulses: Normal pulses.      Heart sounds: Normal heart sounds.   Pulmonary:      Effort: Pulmonary effort is normal. No respiratory distress.      Breath sounds: Normal breath sounds.   Abdominal:      General: Abdomen is flat. Bowel sounds are normal.      Palpations: Abdomen is soft.      Tenderness: There is no abdominal tenderness.   Musculoskeletal:         General: Normal range of motion.      Cervical back: Neck supple.   Skin:     General: Skin is warm and dry.   Neurological:      General: No focal deficit present.      Mental Status: He is alert and oriented to person, place, and time.   Psychiatric:         Mood and Affect: Mood normal.         Behavior: Behavior normal.           Significant Labs:  I have reviewed all pertinent lab results within the past 24 hours.    Significant Diagnostics:  I have reviewed all pertinent imaging results/findings within the past 24 hours.    Assessment/Plan:     Active Diagnoses:    Diagnosis Date Noted POA    PRINCIPAL PROBLEM:  Morbid obesity with BMI of 45.0-49.9, adult [E66.01, Z68.42] 07/22/2025 Not Applicable      Problems Resolved During this Admission:         EGD (MARILIN test if H Pylori positive)  Dr. Campos examined patient, discussed recommendations, obtained  informed consent, and answered all questions.       I, OSCAR Rogers, am scribing for, and in the presence of, Nicolas Campos MD, performed the above scribed service and the documentation accurately describes the services I performed. I attest to the accuracy of the note.     OSCAR Keller  General and Bariatric Surgery

## 2025-07-22 NOTE — DISCHARGE INSTRUCTIONS
Dr. Campos's Post Operative EGD/Upper Endoscopy Instructions:      Please contact our office with any questions or concerns after your upper endoscopy. Not every patient requires a post operative appointment after upper endoscopy. Your post procedure nurse will inform you if post operative appointment with Dr. Campos is required.   Some patients have an upper endoscopy as part of a pre operative workup for a future surgery (such as anti-reflux surgery or weight loss surgery), and other patients have an upper endoscopy after surgery for specific symptoms.   If you are unsure if a post operative appointment is required with Dr. Campos, please feel to contact our office [phone].      Upper Endoscopy, Adult  Upper endoscopy is a procedure to look inside the upper GI (gastrointestinal) tract. The upper GI tract is made up of:   The part of the body that moves food from your mouth to your stomach (esophagus).   The stomach.   The first part of your small intestine (duodenum).  This procedure is also called esophagogastroduodenoscopy (EGD) or gastroscopy. In this procedure, your health care provider passes a thin, flexible tube (endoscope) through your mouth and down your esophagus into your stomach. A small camera is attached to the end of the tube. Images from the camera appear on a monitor in the exam room. During this procedure, your health care provider may also remove a small piece of tissue to be sent to a lab and examined under a microscope (biopsy).  Your health care provider may do an upper endoscopy to diagnose cancers of the upper GI tract. You may also have this procedure to find the cause of other conditions, such as:   Stomach pain.   Heartburn.   Pain or problems when swallowing.   Nausea and vomiting.   Stomach bleeding.   Stomach ulcers.      What happens during the procedure?     An IV will be inserted into one of your veins.   You may be given one or more of the following:  ? A medicine to  help you relax (sedative).  ? A medicine to numb the throat (local anesthetic).   You will lie on your left side on an exam table.   Your health care provider will pass the endoscope through your mouth and down your esophagus.   Your health care provider will use the scope to check the inside of your esophagus, stomach, and duodenum. Biopsies may be taken.   The endoscope will be removed.  The procedure may vary among health care providers and hospitals.  What happens after the procedure?   Your blood pressure, heart rate, breathing rate, and blood oxygen level will be monitored until you leave the hospital or clinic.   Do not drive for 24 hours if you were given a sedative during your procedure.   When your throat is no longer numb, you may be given some fluids to drink.   It is up to you to get the results of your procedure. Ask your health care provider, or the department that is doing the procedure, when your results will be ready.  Summary   Upper endoscopy is a procedure to look inside the upper GI tract.   During the procedure, an IV will be inserted into one of your veins. You may be given a medicine to help you relax.   A medicine will be used to numb your throat.   The endoscope will be passed through your mouth and down your esophagus.  This information is not intended to replace advice given to you by your health care provider. Make sure you discuss any questions you have with your health care provider.  Document Revised: 06/12/2019 Document Reviewed: 05/20/2019  [employer] Patient Education © 2021 [employer]    Upper Endoscopy, Adult, Care After  This sheet gives you information about how to care for yourself after your procedure. Your health care provider may also give you more specific instructions. If you have problems or questions, contact your health care provider.  What can I expect after the procedure?  After the procedure, it is common to have:   A sore throat.   Mild stomach pain or  discomfort.   Bloating.   Nausea.  Follow these instructions at home:     Follow instructions from your health care provider about what to eat or drink after your procedure.   Return to your normal activities as told by your health care provider. Ask your health care provider what activities are safe for you.   Take over-the-counter and prescription medicines only as told by your health care provider.   If you were given a sedative during the procedure, it can affect you for several hours. Do not drive or operate machinery until your health care provider says that it is safe.   Keep all follow-up visits as told by your health care provider. This is important.  Contact a health care provider if you have:   A sore throat that lasts longer than one day.   Trouble swallowing.  Get help right away if:   You vomit blood or your vomit looks like coffee grounds.   You have:  ? A fever.  ? Bloody, black, or tarry stools.  ? A severe sore throat or you cannot swallow.  ? Difficulty breathing.  ? Severe pain in your chest or abdomen.  Summary   After the procedure, it is common to have a sore throat, mild stomach discomfort, bloating, and nausea.   If you were given a sedative during the procedure, it can affect you for several hours. Do not drive or operate machinery until your health care provider says that it is safe.   Follow instructions from your health care provider about what to eat or drink after your procedure.   Return to your normal activities as told by your health care provider.  This information is not intended to replace advice given to you by your health care provider. Make sure you discuss any questions you have with your health care provider.  Document Revised: 12/15/2020 Document Reviewed: 05/20/2019  [employer] Patient Education © 2021 [employer]

## 2025-07-23 ENCOUNTER — HOSPITAL ENCOUNTER (OUTPATIENT)
Facility: HOSPITAL | Age: 45
Discharge: HOME | End: 2025-07-23
Attending: SURGERY | Admitting: SURGERY
Payer: MEDICAID

## 2025-07-23 ENCOUNTER — ANESTHESIA (OUTPATIENT)
Facility: HOSPITAL | Age: 45
End: 2025-07-23
Payer: MEDICAID

## 2025-07-23 ENCOUNTER — ANESTHESIA EVENT (OUTPATIENT)
Facility: HOSPITAL | Age: 45
End: 2025-07-23
Payer: MEDICAID

## 2025-07-23 DIAGNOSIS — E66.01 MORBID OBESITY WITH BMI OF 45.0-49.9, ADULT: ICD-10-CM

## 2025-07-23 LAB
POCT GLUCOSE: 121 MG/DL (ref 70–110)
POCT GLUCOSE: 122 MG/DL (ref 70–110)
TISSUE UREASE (OHS): POSITIVE

## 2025-07-23 PROCEDURE — 27201423 OPTIME MED/SURG SUP & DEVICES STERILE SUPPLY: Performed by: SURGERY

## 2025-07-23 PROCEDURE — 43235 EGD DIAGNOSTIC BRUSH WASH: CPT | Mod: ,,, | Performed by: SURGERY

## 2025-07-23 PROCEDURE — 87081 CULTURE SCREEN ONLY: CPT | Performed by: SURGERY

## 2025-07-23 PROCEDURE — 43235 EGD DIAGNOSTIC BRUSH WASH: CPT | Performed by: SURGERY

## 2025-07-23 PROCEDURE — 37000009 HC ANESTHESIA EA ADD 15 MINS: Performed by: SURGERY

## 2025-07-23 PROCEDURE — 63600175 PHARM REV CODE 636 W HCPCS: Performed by: NURSE PRACTITIONER

## 2025-07-23 PROCEDURE — 63600175 PHARM REV CODE 636 W HCPCS: Performed by: NURSE ANESTHETIST, CERTIFIED REGISTERED

## 2025-07-23 PROCEDURE — 37000008 HC ANESTHESIA 1ST 15 MINUTES: Performed by: SURGERY

## 2025-07-23 RX ORDER — LABETALOL HCL 20 MG/4 ML
10 SYRINGE (ML) INTRAVENOUS EVERY 6 HOURS PRN
Status: DISCONTINUED | OUTPATIENT
Start: 2025-07-23 | End: 2025-07-23 | Stop reason: HOSPADM

## 2025-07-23 RX ORDER — ACETAMINOPHEN 325 MG/1
650 TABLET ORAL EVERY 6 HOURS PRN
Status: DISCONTINUED | OUTPATIENT
Start: 2025-07-23 | End: 2025-07-23 | Stop reason: HOSPADM

## 2025-07-23 RX ORDER — SODIUM CHLORIDE, SODIUM LACTATE, POTASSIUM CHLORIDE, CALCIUM CHLORIDE 600; 310; 30; 20 MG/100ML; MG/100ML; MG/100ML; MG/100ML
INJECTION, SOLUTION INTRAVENOUS CONTINUOUS
Status: DISCONTINUED | OUTPATIENT
Start: 2025-07-23 | End: 2025-07-23 | Stop reason: HOSPADM

## 2025-07-23 RX ORDER — MIDAZOLAM HYDROCHLORIDE 2 MG/2ML
2 INJECTION, SOLUTION INTRAMUSCULAR; INTRAVENOUS
Status: DISCONTINUED | OUTPATIENT
Start: 2025-07-23 | End: 2025-07-23 | Stop reason: HOSPADM

## 2025-07-23 RX ORDER — ONDANSETRON HYDROCHLORIDE 2 MG/ML
4 INJECTION, SOLUTION INTRAVENOUS EVERY 4 HOURS PRN
Status: DISCONTINUED | OUTPATIENT
Start: 2025-07-23 | End: 2025-07-23 | Stop reason: HOSPADM

## 2025-07-23 RX ORDER — PROPOFOL 10 MG/ML
VIAL (ML) INTRAVENOUS
Status: DISCONTINUED | OUTPATIENT
Start: 2025-07-23 | End: 2025-07-23

## 2025-07-23 RX ORDER — LIDOCAINE HYDROCHLORIDE 10 MG/ML
INJECTION, SOLUTION EPIDURAL; INFILTRATION; INTRACAUDAL; PERINEURAL
Status: DISCONTINUED | OUTPATIENT
Start: 2025-07-23 | End: 2025-07-23

## 2025-07-23 RX ADMIN — LIDOCAINE HYDROCHLORIDE 50 MG: 10 INJECTION, SOLUTION EPIDURAL; INFILTRATION; INTRACAUDAL; PERINEURAL at 09:07

## 2025-07-23 RX ADMIN — PROPOFOL 30 MG: 10 INJECTION, EMULSION INTRAVENOUS at 09:07

## 2025-07-23 RX ADMIN — PROPOFOL 40 MG: 10 INJECTION, EMULSION INTRAVENOUS at 09:07

## 2025-07-23 RX ADMIN — PROPOFOL 90 MG: 10 INJECTION, EMULSION INTRAVENOUS at 09:07

## 2025-07-23 RX ADMIN — SODIUM CHLORIDE, POTASSIUM CHLORIDE, SODIUM LACTATE AND CALCIUM CHLORIDE: 600; 310; 30; 20 INJECTION, SOLUTION INTRAVENOUS at 09:07

## 2025-07-23 NOTE — BRIEF OP NOTE
[facility] Surgical - Periop Services 2nd Floor  Brief Operative Note    Surgery Date: 7/23/2025     Surgeons and Role:     * Nicolas Campos MD - Primary    Assisting Surgeon: None    Pre-op Diagnosis:   1. History of H Pylori infection  2. Morbid Obesity    Post-op Diagnosis:    1. History of H Pylori infection  2. Morbid Obesity    Procedure: EGD with MARILIN test    Anesthesia: Monitor Anesthesia Care    Operative Findings: dictated per surgeon    Estimated Blood Loss: less than 5 cc         Specimens:   Specimen (24h ago, onward)      None            * No specimens in log *        Discharge Note    OUTCOME: Patient tolerated treatment/procedure well without complication and is now ready for discharge.    DISPOSITION: Home or Self Care    FINAL DIAGNOSIS:  Morbid obesity with BMI of 45.0-49.9, adult    FOLLOWUP: In clinic    DISCHARGE INSTRUCTIONS:  DC instructions given to pt

## 2025-07-23 NOTE — ANESTHESIA POSTPROCEDURE EVALUATION
Anesthesia Post Evaluation    Patient: Edmund Roberts    Procedure(s) Performed: Procedure(s) (LRB):  EGD (ESOPHAGOGASTRODUODENOSCOPY)     ////EGD with MARILIN (N/A)    Final Anesthesia Type: general      Patient location during evaluation: OPS  Patient participation: Yes- Able to Participate  Level of consciousness: awake and alert  Post-procedure vital signs: reviewed and stable  Pain management: adequate  Airway patency: patent    PONV status at discharge: No PONV  Anesthetic complications: no      Cardiovascular status: blood pressure returned to baseline  Respiratory status: unassisted  Hydration status: euvolemic  Follow-up not needed.              Vitals Value Taken Time       No case tracking events are documented in the log.      Pain/Jose Score: No data recorded

## 2025-07-23 NOTE — TRANSFER OF CARE
"Anesthesia Transfer of Care Note    Patient: Edmund Roberts    Procedure(s) Performed: Procedure(s) (LRB):  EGD (ESOPHAGOGASTRODUODENOSCOPY)     ////EGD with MARILIN (N/A)    Patient location: OPS    Anesthesia Type: general    Transport from OR: Transported from OR on room air with adequate spontaneous ventilation    Post pain: adequate analgesia    Post assessment: no apparent anesthetic complications    Post vital signs: stable    Level of consciousness: sedated and awake    Nausea/Vomiting: no nausea/vomiting    Complications: none    Transfer of care protocol was followed      Last vitals: Visit Vitals  /69   Pulse 84   Temp 36.9 °C (98.5 °F) (Oral)   Resp 20   Ht 5' 8" (1.727 m)   Wt (!) 138.8 kg (306 lb)   SpO2 96%   BMI 46.53 kg/m²     "

## 2025-07-23 NOTE — ANESTHESIA PREPROCEDURE EVALUATION
07/23/2025  Edmund Roberts is a 44 y.o., male.  Procedure Information    Case: [id number] Date/Time: 07/23/25 0845   Procedure: EGD (ESOPHAGOGASTRODUODENOSCOPY)     ////EGD with MARILIN (Abdomen) - EGD with MARILIN   Anesthesia type: Monitor Anesthesia Care   Diagnosis: H. pylori infection [A04.8]   Pre-op diagnosis: H. pylori infection [A04.8]   [address] [facility] 2ND FLR ENDO   Surgeons: Nicolas Campos MD       Pre-op Assessment    I have reviewed the Patient Summary Reports.     I have reviewed the Nursing Notes. I have reviewed the NPO Status.   I have reviewed the Medications.     Review of Systems  Anesthesia Hx:  No problems with previous Anesthesia                Hematology/Oncology:  Hematology Normal   Oncology Normal                                   EENT/Dental:  EENT/Dental Normal           Cardiovascular:  Exercise tolerance: good   Hypertension                  Functional Capacity good / => 4 METS                         Pulmonary:  Pulmonary Normal                       Renal/:   Denies Chronic Renal Disease.                Hepatic/GI:     GERD                Musculoskeletal:  Musculoskeletal Normal                Neurological:  Neurology Normal                                      Endocrine:  Endocrine Normal          Morbid Obesity / BMI > 40  Dermatological:  Skin Normal    Psych:  Psychiatric Normal                    Physical Exam  General: Alert, Oriented, Well nourished and Cooperative    Airway:  Mallampati: II   Mouth Opening: Normal  TM Distance: Normal  Tongue: Normal  Neck ROM: Normal ROM    Dental:  Intact    Chest/Lungs:  Clear to auscultation, Normal Respiratory Rate    Heart:  Rate: Normal  Rhythm: Regular Rhythm       Latest Reference Range & Units 01/17/25 08:58   WBC 4.50 - 11.50 x10(3)/mcL 8.09   RBC 4.70 - 6.10 x10(6)/mcL 5.09   Hemoglobin 14.0 - 18.0  g/dL 14.3   Hematocrit 42.0 - 52.0 % 43.5   MCV 80.0 - 94.0 fL 85.5   MCH 27.0 - 31.0 pg 28.1   MCHC 33.0 - 36.0 g/dL 32.9 (L)   RDW 11.5 - 17.0 % 13.6   Platelet Count 130 - 400 x10(3)/mcL 212   MPV 7.4 - 10.4 fL 11.5 (H)   Neut % % 69.5   LYMPH % % 22.2   Mono % % 6.3   Eos % % 1.1   Basophil % % 0.7   Immature Granulocytes % 0.2   Neut # 2.1 - 9.2 x10(3)/mcL 5.61   Lymph # 0.6 - 4.6 x10(3)/mcL 1.80   Mono # 0.1 - 1.3 x10(3)/mcL 0.51   Eos # 0 - 0.9 x10(3)/mcL 0.09   Baso # <=0.2 x10(3)/mcL 0.06   Immature Grans (Abs) 0.00 - 0.04 x10(3)/mcL 0.02   nRBC % 0.0   Sodium 136 - 145 mmol/L 139   Potassium 3.5 - 5.1 mmol/L 4.0   Chloride 98 - 107 mmol/L 106   CO2 22 - 29 mmol/L 25   Anion Gap mEq/L 8.0   BUN 8.9 - 20.6 mg/dL 11.9   Creatinine 0.72 - 1.25 mg/dL 1.16   BUN/CREAT RATIO  10   eGFR mL/min/1.73/m2 >60   Glucose 74 - 100 mg/dL 104 (H)   Calcium 8.4 - 10.2 mg/dL 9.2   ALP 40 - 150 unit/L 131   PROTEIN TOTAL 6.4 - 8.3 gm/dL 7.3   Albumin 3.5 - 5.0 g/dL 4.1   Albumin/Globulin Ratio 1.1 - 2.0 ratio 1.3   BILIRUBIN TOTAL <=1.5 mg/dL 0.9   AST 5 - 34 unit/L 18   ALT 0 - 55 unit/L 26   Globulin, Total 2.4 - 3.5 gm/dL 3.2   Cholesterol Total <=200 mg/dL 132   HDL 35 - 60 mg/dL 30 (L)   Total Cholesterol/HDL Ratio 0 - 5  4   Triglycerides 34 - 140 mg/dL 121   LDL Cholesterol 50.00 - 140.00 mg/dL 78.00   Very Low Density Lipoprotein  24   Vitamin D 30 - 80 ng/mL 32   Hemoglobin A1C External <=7.0 % 6.3   Estimated Avg Glucose mg/dL 134.1   TSH 0.350 - 4.940 uIU/mL 2.243   (L): Data is abnormally low  (H): Data is abnormally high    Anesthesia Plan  Type of Anesthesia, risks & benefits discussed:    Anesthesia Type: Gen ETT, Gen Natural Airway  Intra-op Monitoring Plan: Standard ASA Monitors  Post Op Pain Control Plan: multimodal analgesia  Induction:  IV  Airway Plan: Direct  Informed Consent: Informed consent signed with the Patient and all parties understand the risks and agree with anesthesia plan.  All questions  answered. Patient consented to blood products? Yes  ASA Score: 2  Day of Surgery Review of History & Physical: H&P Update referred to the surgeon/provider.I have interviewed and examined the patient. I have reviewed the patient's H&P dated: There are no significant changes.     Ready For Surgery From Anesthesia Perspective.     .

## 2025-07-24 ENCOUNTER — CLINICAL SUPPORT (OUTPATIENT)
Dept: BARIATRICS | Facility: HOSPITAL | Age: 45
End: 2025-07-24

## 2025-07-24 VITALS
RESPIRATION RATE: 20 BRPM | DIASTOLIC BLOOD PRESSURE: 60 MMHG | HEART RATE: 72 BPM | OXYGEN SATURATION: 97 % | TEMPERATURE: 99 F | BODY MASS INDEX: 46.38 KG/M2 | SYSTOLIC BLOOD PRESSURE: 109 MMHG | HEIGHT: 68 IN | WEIGHT: 306 LBS

## 2025-07-24 VITALS — WEIGHT: 313 LBS | HEIGHT: 68 IN | BODY MASS INDEX: 47.44 KG/M2

## 2025-07-24 DIAGNOSIS — E66.01 MORBID OBESITY WITH BMI OF 45.0-49.9, ADULT: Primary | ICD-10-CM

## 2025-07-24 NOTE — PROGRESS NOTES
Pt in office for monthly weigh in. Called prior to the appt knowing he had a wt gain due to 10 d of oral steroids and steroid by injection, wt at time of call 321# per pt's scale. Since call, pt has lost down to 313#. Pt has been taking fluid pill and following diet as he was previously. Pt also exercising at gym 3d/wk.    B- eggs or Seeq protein shake when at gym  L- meal prepped in advance: grilled chicken/ground turkey, spinach, broccoli, cauliflower and 1/2 rice or protein pasta  S- same as lunch; pt preps for week at a time  Bevs- water, powerade- recently eliminated due to noticing sugar content    Pt advised to continue on current meal plan and to take meds as prescribed. Pt scheduled to attend pre-op class on 8/19/25 where pre-op diet protocol will be discussed.

## 2025-07-24 NOTE — OP NOTE
[facility] - Periop Services 2nd Floor  Endoscopy Procedure  Operative Note    SUMMARY     Date of Procedure: 7/23/2025     Procedure: Procedure(s) (LRB):  EGD (ESOPHAGOGASTRODUODENOSCOPY)     ////EGD with MARILIN (N/A)    Surgeons and Role:     * Nicolas Campos MD - Primary    Assisting Surgeon: None    Pre-Operative Diagnosis: H. pylori infection [A04.8]    Post-Operative Diagnosis: Post-Op Diagnosis Codes:     * H. pylori infection [A04.8]    Indications: GERD    Procedure:                   EGD    Findings:                   Patient was taken to the OR.  The patient's throat was aneshtetized.  MAC was utilized for anesthesia.   was easily passed.  Findings were as follows:  -Oropharynx: normal  -Airway: normal  -Esophagus: normal  -Stomach: normal mucosa   -Duodenum: normal    Marilin test performed by biopsy of antral mucosa.     Specimens:   Specimen (24h ago, onward)      None              Complications: None     Diagnostic Impression: normal     Recommendations: Discharge patient to home. Patient has a contact number available for emergencies. The signs and symptoms of potential delayed complications were discussed with the patient. Return to normal activities tomorrow. Written discharge instructions were provided to the patient. Resume previous diet. Continue present medications.    Disposition: PACU - hemodynamically stable.        Follow Up:             Future Appointments   Date Time Provider Department Center   7/24/2025 11:00 AM [facility]   8/5/2025  9:40 AM Adam Chan MD [facility] ORTHO [facility]   8/7/2025 10:00 AM AUDIOLOGIST 2, [facility] AUDIOLOGY [facility] AUDIO [facility]   8/7/2025 10:30 AM Arielle Cisneros FNP [facility] ENT [facility]   8/19/2025  8:30 AM CLASS, [facility] BARIATRIC SURGERY CLASS [facility] BARPR [facility]   9/4/2025  8:30 AM [facility]   9/4/2025  8:50 AM Duyen Castillo [facility]   9/4/2025   9:15 AM Nicolas Campos MD [facility]   9/10/2025  8:10 AM CLASS, [facility] BARIATRIC SURGERY CLASS [facility]       DISCHARGE NOTE    DISCHARGE DATE:  07/24/2025    PRINCIPAL DIAGNOSIS:  Morbid obesity with BMI of 45.0-49.9, adult    OUTCOME:  Satisfactory    DISPOSITION:  Home    FOLLOWUP:  In general surgery clinic        Nicolas Campos MD  7/24/2025

## 2025-07-29 ENCOUNTER — TELEPHONE (OUTPATIENT)
Dept: SURGERY | Facility: HOSPITAL | Age: 45
End: 2025-07-29
Payer: MEDICAID

## 2025-07-29 ENCOUNTER — TELEPHONE (OUTPATIENT)
Dept: SURGERY | Facility: CLINIC | Age: 45
End: 2025-07-29
Payer: MEDICAID

## 2025-07-29 DIAGNOSIS — A04.8 H. PYLORI INFECTION: Primary | ICD-10-CM

## 2025-07-29 RX ORDER — OMEPRAZOLE MAGNESIUM, AMOXICILLIN AND RIFABUTIN 10; 250; 12.5 MG/1; MG/1; MG/1
4 CAPSULE, DELAYED RELEASE ORAL EVERY 8 HOURS
Qty: 84 CAPSULE | Refills: 0 | Status: SHIPPED | OUTPATIENT
Start: 2025-07-29 | End: 2025-08-12

## 2025-07-29 NOTE — TELEPHONE ENCOUNTER
William was sent in to the pts pharmacy. Will set reminder to FU on completion.    Ju I am routing to you because I am not sure if this needs to be put in baritracs.

## 2025-07-29 NOTE — TELEPHONE ENCOUNTER
EGD with SUZANNE done last week. Pt called office for results.     Suzanne + for H Pylori. Discussed this w pt today.    Will retreat with Talicia. Already treated with Abx for H pylori and continued to test +.    Repeat breath test after treatment.    VSG 9/17/25

## 2025-07-29 NOTE — TELEPHONE ENCOUNTER
----- Message from Aj Manning sent at 4/8/2025 10:28 AM CDT -----  Regarding: Clearance Request  Request cardiac clearance for upcoming VSG 9/17/2025 @ [facility]  Cardio - Dr. Romina Loco

## 2025-08-05 ENCOUNTER — HOSPITAL ENCOUNTER (OUTPATIENT)
Dept: RADIOLOGY | Facility: HOSPITAL | Age: 45
Discharge: HOME | End: 2025-08-05
Attending: SURGERY
Payer: MEDICAID

## 2025-08-05 ENCOUNTER — OFFICE VISIT (OUTPATIENT)
Dept: ORTHOPEDICS | Facility: CLINIC | Age: 45
End: 2025-08-05
Payer: MEDICAID

## 2025-08-05 VITALS
DIASTOLIC BLOOD PRESSURE: 83 MMHG | HEIGHT: 68 IN | OXYGEN SATURATION: 96 % | HEART RATE: 78 BPM | BODY MASS INDEX: 47.74 KG/M2 | WEIGHT: 315 LBS | SYSTOLIC BLOOD PRESSURE: 147 MMHG

## 2025-08-05 DIAGNOSIS — M25.552 PAIN IN LEFT HIP: ICD-10-CM

## 2025-08-05 DIAGNOSIS — M25.552 GREATER TROCHANTERIC PAIN SYNDROME OF LEFT LOWER EXTREMITY: Primary | ICD-10-CM

## 2025-08-05 DIAGNOSIS — M76.32 IT BAND SYNDROME, LEFT: ICD-10-CM

## 2025-08-05 PROCEDURE — 99214 OFFICE O/P EST MOD 30 MIN: CPT | Mod: PBBFAC,25 | Performed by: SURGERY

## 2025-08-05 PROCEDURE — 20550 NJX 1 TENDON SHEATH/LIGAMENT: CPT | Mod: PBBFAC,LT | Performed by: SURGERY

## 2025-08-05 PROCEDURE — 73502 X-RAY EXAM HIP UNI 2-3 VIEWS: CPT | Mod: TC,LT

## 2025-08-05 PROCEDURE — 76942 ECHO GUIDE FOR BIOPSY: CPT | Mod: PBBFAC | Performed by: SURGERY

## 2025-08-05 RX ORDER — LIDOCAINE HYDROCHLORIDE 10 MG/ML
5 INJECTION, SOLUTION EPIDURAL; INFILTRATION; INTRACAUDAL; PERINEURAL
Status: COMPLETED | OUTPATIENT
Start: 2025-08-05 | End: 2025-08-05

## 2025-08-05 RX ORDER — METHYLPREDNISOLONE ACETATE 40 MG/ML
40 INJECTION, SUSPENSION INTRA-ARTICULAR; INTRALESIONAL; INTRAMUSCULAR; SOFT TISSUE
Status: COMPLETED | OUTPATIENT
Start: 2025-08-05 | End: 2025-08-05

## 2025-08-05 RX ADMIN — LIDOCAINE HYDROCHLORIDE 50 MG: 10 INJECTION, SOLUTION EPIDURAL; INFILTRATION; INTRACAUDAL; PERINEURAL at 11:08

## 2025-08-05 RX ADMIN — METHYLPREDNISOLONE ACETATE 40 MG: 40 INJECTION, SUSPENSION INTRA-ARTICULAR; INTRALESIONAL; INTRAMUSCULAR; SOFT TISSUE at 11:08

## 2025-08-05 NOTE — PROGRESS NOTES
"Subjective:    Patient ID: Edmund Roberts is a 44 y.o. male  who presented to [facility] for follow-up of left hip pain      Chief Complaint: Pain of the Left Hip      History of Present Illness:  Pain      Edmund Roberts who has a history of bilateral cubital and carpel tunnel syndrome, and previous left hip meralgia paresthetica, presented today for follow-up of left anterolateral hip pain. He reports that he has a burning sensation in the L anterior thigh, and a shooting / burning pain going down the left lateral thigh. The lateral thigh pain is worse and bothers him most at night when he tries to sleep, and it radiates down to his left anterolateral knee. He has tried Mobic previously which helped mildly but he has stopped taking that in anticipation for his bariatric surgery coming up in 1-2 months. He has been having his girlfriend help massage his thigh which helps. He has tried heat which makes the pain worse. He would like some pain relief today.      Hip Review of Systems:  Swelling?  No  Instability?  No  Mechanical sx?  No  Consistent clicking/popping? No  <30 min AM stiffness? No  Limited ROM? No  Fever/Chills? No    Current Choice of Exercise:  none    ROS       Objective:      Physical Exam:    BP (!) 147/83   Pulse 78   Ht 5' 8" (1.727 m)   Wt (!) 144 kg (317 lb 7.4 oz)   SpO2 96%   BMI 48.27 kg/m²     Ortho/SPM Exam    Appearance:  Normal gait/station  FWB  Alignment: Left: normal Right: normal   Soft tissue swelling: Left: no Right: no  Effusion: Left:  Negative Right: Negative  Erythema: Left no Right: no  Ecchymosis: Left: no Right: no  Atrophy: Left: no Right: no    Palpation:  Hip/Back Tenderness: Left: Greater Trochanter and IT Band (proximal 1/3 - lateral femoral condyle) Right: None     Range of motion:  HIP  Flexion (135): Left: 135 Right: 135  Abduction (45-50): Left: 45 Right: 45  Internal Rotation (35): Left: 25  Right: " 25  External Rotation (45):Left: 45 Right: 45    Strength:  Flexion: Left 5/5 Pain: No Right   5/5 Pain: No  Abduction: Left 4/5  Pain: Yes     Right 4/5 Pain: No  Adduction: Left 5/5  Pain: No     Right 5/5 Pain: No    Special Tests:  Log Roll: Left: Negative Right: Negative  FADIR: Left: Negative Right: Negative  CAROLYN: Left: Negative Right: Negative  Trendelenburg test: Left: Positive Right: Positive  Attila: Left: Positive Right: Negative   Straight Leg Raise Left: Negative Right: Negative    General appearance: NAD  Peripheral pulses: normal bilaterally   Reflexes: Left: normal Right normal   Sensation: normal    Labs:  Last A1c: 6.3     Imaging:   Previous images reviewed.  X-rays ordered and performed today: Yes  # of views: 3 Laterality: left hip 8/5/25  My Interpretation:  Mild bilateral hip inferomedial joint space narrowing.          Assessment:        Encounter Diagnoses   Name Primary?    Greater trochanteric pain syndrome of left lower extremity Yes    It band syndrome, left         Plan:           Orders Placed This Encounter   Procedures    Left IT Band Tendon Sheath CSI     This order was created via procedure documentation    X-Ray Hip 2 or 3 views Left with Pelvis when performed     Standing Status:   Future     Number of Occurrences:   1     Expected Date:   8/5/2025     Expiration Date:   8/5/2026     May the Radiologist modify the order per protocol to meet the clinical needs of the patient?:   Yes     Release to patient:   Immediate    ULS US Guidance for Needle Placement     Release to patient:   Immediate     Medications Ordered This Encounter   Medications    LIDOcaine (PF) 10 mg/ml (1%) injection 50 mg    LIDOcaine (PF) 10 mg/ml (1%) injection 50 mg    methylPREDNISolone acetate injection 40 mg       MDM: Prior external referring provider notes reviewed. Prior external referring provider studies reviewed.   Patient is presenting with left anterior thigh burning and left lateral thigh  pain/burning that radiates down to left anterolateral knee. Exam consistent with left meralgia paresthetica + left IT band syndrome. I have discussed modifications such as looser clothings, weight management, and CSI for meralgia paresthetica pain; and left IT band tendon sheath CSI / physical therapy for IT band syndrome. He would like to proceed with left IT band tendon sheath injection today. I have explained risks, benefits, and alternatives with patient and he agreed to proceed.   Dx: left IT band syndrome Acute in moderate exacerbation; left Meralgia Paresthetica Acute in moderate exacerbation.   Treatment Plan: Discussed with patient diagnosis, prognosis, and treatment recommendations. Education provided.    Imaging: radiological studies ordered and independently reviewed; discussed with patient; pending radiologist interpretation.   Weight Management: is paramount. Recommend to discuss with PCP about medication and bariatric surgery options for weight loss if your BMI is >35 and applicable. A BMI of <24.9 may provide further relief..   Procedure: Discussed CSI/VSI as treatment options; discussed injections as treatment options; since conservative measures did not improve symptoms patient consented for CSI today.  Activity: Activity as tolerated; HEP to include aerobic conditioning and strength training with non-painful activity. ROM/STG exercises. Proper footware; assistive devises to avoid limping.   Therapy: No formal therapy - patient declined PT referral, will perform home exercise program focusing on IT band stretching. Discussed use of foam roller / massage gun, and stretching exercise program for IT band syndrome.  Medication: CONTINUE over-the-counter acetaminophen (Tylenol 1000 mg three times per day as needed)  CONTINUE Voltaren Gel 1% as prescribed  CONTINUE over-the-counter NSAIDs (ibuprofen 200mg three tablets three times a day as needed). Please see your primary care physician for further  refills.  RTC: PRN; call if any issues.         Left IT Band Tendon Sheath CSI    Date/Time: 8/5/2025 9:40 AM    Performed by: Adam Chan MD  Authorized by: Adam Chan MD    Consent Done?:  Yes (Written)  Indications:  Pain and diagnostic evaluation  Site marked: the procedure site was marked    Timeout: prior to procedure the correct patient, procedure, and site was verified    Local anesthesia used?: Yes    Anesthesia:  Local infiltration  Local anesthetic:  Topical anesthetic and lidocaine 1% without epinephrine  Anesthetic total (ml):  5    Location: Left Hip IT band Tendon Sheath (at Greater Trochanter)  Ultrasonic guidance for needle placement?: Yes    Needle size:  22 G  Approach:  Lateral  Patient tolerance:  Patient tolerated the procedure well with no immediate complications    Additional Comments: Staff Attending: Adam Chan MD    Risks:  Possible complications with the injection include bleeding, infection (.01%), tendon rupture, steroid flare, fat pad or soft tissue atrophy, skin depigmentation, allergic reaction to medications and vasovagal response. (steroid flare treatment is rest, ice, NSAIDs and resolves in 24-36 hours.)    Consent:  No absolute contraindications (cellulitis overlying joint, infection, lack of informed consent, allergy to injection medication, AVN protein or egg allergy for sodium hyaluronate, or history of steroid flare) or relative contraindications (uncontrolled DM2 A1c>10, coagulopathy, INR > 3.5, previous joint replacement or history of AVN).        Description:  The patient was prepped in normal sterile fashion use of chlorhexidine scrub and the appropriate and anatomic landmarks were identified WITH ULTRASOUND as image guidance. The patient was evaluated with a WalkHub ultrasound machine using a 10 MHz linear probe, following a standard protocol. Ultrasound imaging confirmed placement of the needle in the correct position, with reference to surrounding  anatomic structures.  Dynamic visualization of the needle was continuous throughout the procedure(s) and maintained good position. Care was taken to ensure there was unrestricted flow of syringe contents (listed below) into the site of injection. The ultrasound image for needle placement was captured and saved in the patient's medical record.        Indication for use of Ultrasound Guidance (elevated BMI): The indication for the use of ultrasound was to ensure accurate injection due to soft tissue plethora, and to ensure accurate localization of needle for aspiration and/or injection, and minimize risk of damage to surrounding structures. Geri EL, Arie S, Db LJ, Sanjeev BJ. Improving injection accuracy of the elbow, knee, and shoulder: does injection site and imaging make a difference? A systematic review. Am J Sports Med. 2011 Mar;39(3):656-62. doi: 10.1177/7306649989589197. Epub 2011 Jan 21. PMID: 92995302.    BMI 48.27 kg/m2    Contents of syringe included: 5mL of 1% lidocaine with 40mg of Depo-Medrol (1mL of 40mg/mL)    Post Procedure: Patient alert, and moving all extremities. ROM improved, pain decreased.  Good peripheral pulses, no signs of vascular compromise and range of motion intact.  Aftercare instructions were given to patient at time of discharge.  Relative rest for 3 days-avoiding excess activity.  Place ice on the area for 15 minutes every 4-6 hours. Patient may take Tylenol a 1000 mg b.i.d. or ibuprofen 600 mg t.i.d. for the next 3-4 days if not on medication already and safe to take pending co-morbidities.  Protect the area for the next 1-8 hours if anesthetic was used.  Avoid excessive activity for the next 3-4 weeks.  ER precautions given for fever, severe joint pain or allergic reaction or other new symptoms related to the joint injection.        I have spent no less than 40 minutes of time for direct face-to-face patient care, chart review, placing referral/test/imaging orders, and  documentation of this encounter today.      Adam Chan MD  Sports Medicine

## 2025-08-06 NOTE — PROGRESS NOTES
"[facility]  Otolaryngology Clinic Note    Edmund Roberts  YOB: 1980    Chief Complaint:   Chief Complaint   Patient presents with    referral: Otalgia of Right Ear          HPI: 05/23/2025: 44 y.o. male referred for otalgia. States he is here today for ongoing sinus and ear issues. He has a history of two sinus surgeries performed by Dr. Hendricks, most recenlty 3-4 years ago. He experiences ongoing nasal buildup requiring frequent cleaning, persistent dry throat despite CPAP temperature adjustments and humidifier use, and sneezing. He reports his hearing often becomes "thin" and experiences ear pain. While he can pop his ears open, they do not remain open for long periods. He does not use sinus rinses. He uses flonase infrequently when symptoms are severe. He thinks allergy testing many years ago was essentially negative. He has a history of acid reflux for which he takes protonix prn. He uses prescribed nasal spray when symptoms are severe.    08/07/2025: States he had a cold and fever about a month ago. His daughter had covid, however, he was swabbed and told his tests were negative. He was placed on steroids but continues to have a lingering cough. His cough is dry. He has not had a change in nasal mucous. States he is doing rinses once a week. He expresses concern that he saw gray to black material on CPAP filter and home a/c filter. States he had a tree cut down that was overlying his house and has also had the units changed. Reports cleaning his nose with qtips and seeing mucous which appeared light gray. Reports mucous returned when he does sinus rinses is clear. He has tried OTC cough suppressants and mucinex without benefit. Denies SOB. Continues to have occasional ear popping associated with hearing changes.     ROS:   10-point review of systems negative except per HPI      Review of patient's allergies indicates:  No Known Allergies    Past Medical History:   Diagnosis " Date    Arthritis     B12 deficiency     Cholesterol depletion     Elevated serum creatinine     Gastritis     GERD (gastroesophageal reflux disease)     Hypertension     Hyperuricemia     IBS (irritable bowel syndrome)     Knee pain     Mixed hyperlipidemia     Obesity, unspecified     ALICE (obstructive sleep apnea)     Plantar fasciitis, bilateral     Pre-diabetes     Prostatitis, chronic     Sleep apnea     Vitamin D3 deficiency        Past Surgical History:   Procedure Laterality Date    BACK SURGERY  2006    BACK SURGERY  2013    CARPAL TUNNEL RELEASE  2019    CHOLECYSTECTOMY  06/2018    CYST REMOVAL Left 2005    SINUS SURGERY      SPINE SURGERY      TONSILLECTOMY  03/2017    TRIGGER FINGER RELEASE  11/2019    VASECTOMY      vastectomy         [Social History]    [Social History]  Socioeconomic History    Marital status: Single    Number of children: 5   Tobacco Use    Smoking status: Never    Smokeless tobacco: Never   Substance and Sexual Activity    Alcohol use: No    Drug use: Not Currently     Comment: medical Marijuana    Sexual activity: Not Currently     Partners: Female     Birth control/protection: Abstinence     Social Drivers of Health     Financial Resource Strain: Medium Risk (2/25/2025)    Overall Financial Resource Strain (CARDIA)     Difficulty of Paying Living Expenses: Somewhat hard   Food Insecurity: No Food Insecurity (2/25/2025)    Hunger Vital Sign     Worried About Running Out of Food in the Last Year: Never true     Ran Out of Food in the Last Year: Never true   Transportation Needs: No Transportation Needs (2/25/2025)    PRAPARE - Transportation     Lack of Transportation (Medical): No     Lack of Transportation (Non-Medical): No   Physical Activity: Inactive (2/25/2025)    Exercise Vital Sign     Days of Exercise per Week: 0 days     Minutes of Exercise per Session: 0 min   Stress: No Stress Concern Present (2/25/2025)    [employer] - Occupational Stress  Questionnaire     Feeling of Stress : Not at all   Housing Stability: Low Risk  (2/25/2025)    Housing Stability Vital Sign     Unable to Pay for Housing in the Last Year: No     Homeless in the Last Year: No       Family History   Problem Relation Name Age of Onset    Hypertension Mother      Drug abuse Father      Hypertension Sister      Cancer Maternal Grandfather          lung       [Encounter Medications]    [Encounter Medications]  Outpatient Encounter Medications as of 5/23/2025   Medication Sig Dispense Refill    atorvastatin (LIPITOR) 40 MG tablet Take 40 mg by mouth once daily.      hydroCHLOROthiazide (HYDRODIURIL) 50 MG tablet Take 1 tablet (50 mg total) by mouth once daily. (Patient taking differently: Take 12.5 mg by mouth once daily.) 30 tablet 2    irbesartan (AVAPRO) 300 MG tablet Take 300 mg by mouth once daily.      ketoconazole (NIZORAL) 2 % cream Apply 1 Application topically as needed.      metFORMIN (GLUCOPHAGE-XR) 500 MG ER 24hr tablet Take 1 tablet (500 mg total) by mouth daily with breakfast. 90 tablet 3    pantoprazole (PROTONIX) 40 MG tablet Take 40 mg by mouth once daily.      tamsulosin (FLOMAX) 0.4 mg Cap Take 0.4 mg by mouth.      cetirizine (ZYRTEC) 10 MG tablet Take 1 tablet (10 mg total) by mouth once daily. 30 tablet 11    ciprofloxacin-dexAMETHasone 0.3-0.1% (CIPRODEX) 0.3-0.1 % DrpS 4 drops. (Patient not taking: Reported on 5/23/2025)      diclofenac (VOLTAREN) 75 MG EC tablet Take 75 mg by mouth 2 (two) times daily. (Patient not taking: Reported on 5/23/2025)      meloxicam (MOBIC) 15 MG tablet Take 15 mg by mouth. (Patient not taking: Reported on 5/23/2025)      omeprazole (PRILOSEC) 20 MG capsule Take 1 capsule (20 mg total) by mouth 2 (two) times a day. for 14 days (Patient not taking: Reported on 5/23/2025) 28 capsule 0     No facility-administered encounter medications on file as of 5/23/2025.       Physical Exam:  Vitals:    05/23/25 0848   BP: 122/84   BP Location:  Right arm   Patient Position: Sitting   Pulse: 80   Temp: 98.1 °F (36.7 °C)   TempSrc: Oral       Physical Exam   General: NAD, voice normal  Neuro: AAO, CN II - XII grossly intact  Head/ Face: NCAT, symmetric, sensations intact bilaterally  Eyes: EOMI, PERRL  Ears: externally normal with grossly normal hearing  AD: EAC patent, TM intact, no middle ear effusion, no retractions  AS: EAC patent, TM intact, no middle ear effusion, no retractions  Nose: bilateral nares patent, midline septum, no rhinorrhea, no external deformity, +ITH.   OC/OP: MMM, no intraoral lesions, no trismus, dentition is good, no uvular deviation, bilaterally symmetric soft palate elevation. Mallampati IV with strong gag (unable to visualize posterior pharynx 2/2 pt tolerance and gag)  Indirect laryngoscopy: deferred due to patient intolerance  Neck: soft, supple, no LAD, normal ROM, no thyromegaly  Respiratory: nonlabored, no wheezing, bilateral chest rise  Cardiovascular: RRR  Gastrointestinal: S NT ND  Skin: warm, no lesions  Musculoskeletal: 5/5 strength  Psych: Appropriate affect/mood     Pertinent Data:  ? LABS:  ? AUDIO:             ? PATH:      Imaging:   I personally reviewed the following images:        Assessment/Plan:  44 y.o. male with AR, CRS, ETD. Hx of ESS x 2. Audio WNL, type A tymps- reviewed.   - NSI Daily  - Flonase BID (reviewed technique)  - Start astelin BID  - Saline gel or vaseline to b/l nares QHS & prn  - Zyrtec daily  - Tessalon perles TID prn  - RTC 3mo. If no improvement, will consider nasal endoscopy +/- CT sinus     Arielle Cisneros NP

## 2025-08-07 ENCOUNTER — OFFICE VISIT (OUTPATIENT)
Dept: OTOLARYNGOLOGY | Facility: CLINIC | Age: 45
End: 2025-08-07
Payer: MEDICAID

## 2025-08-07 ENCOUNTER — CLINICAL SUPPORT (OUTPATIENT)
Dept: AUDIOLOGY | Facility: HOSPITAL | Age: 45
End: 2025-08-07
Payer: MEDICAID

## 2025-08-07 VITALS — SYSTOLIC BLOOD PRESSURE: 127 MMHG | DIASTOLIC BLOOD PRESSURE: 88 MMHG | HEART RATE: 76 BPM | TEMPERATURE: 98 F

## 2025-08-07 DIAGNOSIS — R05.9 COUGH, UNSPECIFIED TYPE: Primary | ICD-10-CM

## 2025-08-07 DIAGNOSIS — H91.90 HEARING DISORDER, UNSPECIFIED LATERALITY: ICD-10-CM

## 2025-08-07 DIAGNOSIS — H69.90 DYSFUNCTION OF EUSTACHIAN TUBE, UNSPECIFIED LATERALITY: ICD-10-CM

## 2025-08-07 DIAGNOSIS — J34.89 DRY NOSE: ICD-10-CM

## 2025-08-07 DIAGNOSIS — J30.9 ALLERGIC RHINITIS, UNSPECIFIED SEASONALITY, UNSPECIFIED TRIGGER: ICD-10-CM

## 2025-08-07 DIAGNOSIS — J32.9 CHRONIC SINUSITIS, UNSPECIFIED LOCATION: ICD-10-CM

## 2025-08-07 PROCEDURE — 92567 TYMPANOMETRY: CPT | Performed by: AUDIOLOGIST-HEARING AID FITTER

## 2025-08-07 PROCEDURE — 3079F DIAST BP 80-89 MM HG: CPT | Mod: CPTII,,, | Performed by: NURSE PRACTITIONER

## 2025-08-07 PROCEDURE — 3074F SYST BP LT 130 MM HG: CPT | Mod: CPTII,,, | Performed by: NURSE PRACTITIONER

## 2025-08-07 PROCEDURE — 99213 OFFICE O/P EST LOW 20 MIN: CPT | Mod: PBBFAC,25 | Performed by: NURSE PRACTITIONER

## 2025-08-07 PROCEDURE — 4010F ACE/ARB THERAPY RXD/TAKEN: CPT | Mod: CPTII,,, | Performed by: NURSE PRACTITIONER

## 2025-08-07 PROCEDURE — 92557 COMPREHENSIVE HEARING TEST: CPT | Performed by: AUDIOLOGIST-HEARING AID FITTER

## 2025-08-07 PROCEDURE — 3044F HG A1C LEVEL LT 7.0%: CPT | Mod: CPTII,,, | Performed by: NURSE PRACTITIONER

## 2025-08-07 PROCEDURE — 1159F MED LIST DOCD IN RCRD: CPT | Mod: CPTII,,, | Performed by: NURSE PRACTITIONER

## 2025-08-07 PROCEDURE — 99213 OFFICE O/P EST LOW 20 MIN: CPT | Mod: S$PBB,,, | Performed by: NURSE PRACTITIONER

## 2025-08-07 RX ORDER — TRAMADOL HYDROCHLORIDE 50 MG/1
TABLET, FILM COATED ORAL
COMMUNITY

## 2025-08-07 RX ORDER — FLUTICASONE PROPIONATE 50 MCG
SPRAY, SUSPENSION (ML) NASAL
COMMUNITY
Start: 2025-07-05

## 2025-08-07 RX ORDER — BENZONATATE 200 MG/1
200 CAPSULE ORAL 3 TIMES DAILY PRN
Qty: 30 CAPSULE | Refills: 0 | Status: SHIPPED | OUTPATIENT
Start: 2025-08-07 | End: 2025-08-17

## 2025-08-07 RX ORDER — AZELASTINE 1 MG/ML
1 SPRAY, METERED NASAL 2 TIMES DAILY
Qty: 30 ML | Refills: 5 | Status: SHIPPED | OUTPATIENT
Start: 2025-08-07 | End: 2026-08-07

## 2025-08-07 NOTE — PROGRESS NOTES
Audiological Evaluation    Patient History:    Patient evaluated today to assess hearing with slight difficulties perceived at this time.  He reports intermittent aural fullness and popping of the ears likely secondary to eustachian tube dysfunction.  Dizziness, otalgia, otorrhea and a history of middle ear involvement/otologic procedures have been denied at this time, however Mr. Roberts endorses constant tinnitus, bilaterally. Medical history is reportedly unchanged since most recent medical evaluation.       Pure Tone Testing:     Right ear:   Normal hearing sensitivity for the frequencies 250-8000 Hz    Left ear: Normal hearing sensitivity for the frequencies 250-8000 Hz    Tympanometry:      Right ear:   Type 'A' tympanogram    Left ear: Type 'A' tympanogram      Acoustic Reflexes Testing:      Right ear:   Did not test     Left ear: Did not test       Interpretations:    Pure tone testing revealed normal hearing sensitivity for the frequencies 250-8000 Hz, bilaterally.  Speech reception thresholds were obtained at 15 dB HL consistent with pure tone results, bilaterally.  Word recognition scores were excellent, bilaterally.  Immittance testing revealed Type A tympanograms indicative of normal middle ear function, bilaterally.   Otoscopy revealed clear EACs and normal appearance of TMs, bilaterally.      Recommendations:     Audiological testing PRN   ENT evaluation PRN  Return to PCP for follow up and recommendations    Jahaira Waldrop.  Clinical Audiologist

## 2025-08-19 ENCOUNTER — CLINICAL SUPPORT (OUTPATIENT)
Dept: BARIATRICS | Facility: HOSPITAL | Age: 45
End: 2025-08-19

## 2025-08-19 VITALS — BODY MASS INDEX: 47.74 KG/M2 | HEIGHT: 68 IN | WEIGHT: 315 LBS

## 2025-08-19 DIAGNOSIS — E66.01 MORBID OBESITY WITH BMI OF 45.0-49.9, ADULT: Primary | ICD-10-CM

## 2025-08-26 ENCOUNTER — TELEPHONE (OUTPATIENT)
Dept: SURGERY | Facility: CLINIC | Age: 45
End: 2025-08-26
Payer: MEDICAID

## 2025-08-27 ENCOUNTER — LAB VISIT (OUTPATIENT)
Dept: LAB | Facility: HOSPITAL | Age: 45
End: 2025-08-27
Attending: SURGERY
Payer: MEDICAID

## 2025-08-27 DIAGNOSIS — A04.8 H. PYLORI INFECTION: ICD-10-CM

## 2025-08-27 PROCEDURE — 83013 H PYLORI (C-13) BREATH: CPT

## 2025-08-28 LAB — UREA BREATH TEST QL: NEGATIVE

## 2025-09-04 ENCOUNTER — CLINICAL SUPPORT (OUTPATIENT)
Dept: BARIATRICS | Facility: HOSPITAL | Age: 45
End: 2025-09-04

## 2025-09-04 ENCOUNTER — OFFICE VISIT (OUTPATIENT)
Dept: SURGERY | Facility: CLINIC | Age: 45
End: 2025-09-04
Payer: MEDICAID

## 2025-09-04 VITALS — WEIGHT: 308.5 LBS | BODY MASS INDEX: 46.91 KG/M2

## 2025-09-04 VITALS
WEIGHT: 308 LBS | SYSTOLIC BLOOD PRESSURE: 126 MMHG | HEART RATE: 67 BPM | HEIGHT: 68 IN | DIASTOLIC BLOOD PRESSURE: 86 MMHG | BODY MASS INDEX: 46.68 KG/M2

## 2025-09-04 DIAGNOSIS — E66.01 MORBID OBESITY WITH BMI OF 45.0-49.9, ADULT: Primary | ICD-10-CM

## 2025-09-04 DIAGNOSIS — Z71.89 ENCOUNTER FOR PRE-BARIATRIC SURGERY COUNSELING AND EDUCATION: ICD-10-CM

## 2025-09-04 DIAGNOSIS — Z86.19 HISTORY OF HELICOBACTER PYLORI INFECTION: ICD-10-CM

## 2025-09-04 DIAGNOSIS — Z01.818 PREOP EXAMINATION: ICD-10-CM

## 2025-09-04 PROCEDURE — 99214 OFFICE O/P EST MOD 30 MIN: CPT | Mod: ,,, | Performed by: SURGERY

## 2025-09-04 PROCEDURE — 4010F ACE/ARB THERAPY RXD/TAKEN: CPT | Mod: CPTII,,, | Performed by: SURGERY

## 2025-09-04 PROCEDURE — 1159F MED LIST DOCD IN RCRD: CPT | Mod: CPTII,,, | Performed by: SURGERY

## 2025-09-04 PROCEDURE — 3044F HG A1C LEVEL LT 7.0%: CPT | Mod: CPTII,,, | Performed by: SURGERY

## 2025-09-04 PROCEDURE — 3008F BODY MASS INDEX DOCD: CPT | Mod: CPTII,,, | Performed by: SURGERY

## 2025-09-04 PROCEDURE — 3079F DIAST BP 80-89 MM HG: CPT | Mod: CPTII,,, | Performed by: SURGERY

## 2025-09-04 PROCEDURE — 3074F SYST BP LT 130 MM HG: CPT | Mod: CPTII,,, | Performed by: SURGERY

## 2025-09-04 RX ORDER — SILDENAFIL 50 MG/1
50 TABLET, FILM COATED ORAL DAILY PRN
COMMUNITY

## (undated) DEVICE — SPONGE COTTON TRAY 4X4IN

## (undated) DEVICE — ADAPTER DBL MALE LL CLR POLY

## (undated) DEVICE — SOL IRRI STRL WATER 1000ML

## (undated) DEVICE — JELLY SURGILUBE LUBE TUBE 2OZ

## (undated) DEVICE — FORCEP ALLIGATOR 2.8MM W/NDL

## (undated) DEVICE — TUBING O2 FEMALE CONN 13FT

## (undated) DEVICE — ADAPTER DUAL NSL LUER M-M 7FT